# Patient Record
Sex: MALE | Race: WHITE | NOT HISPANIC OR LATINO | URBAN - METROPOLITAN AREA
[De-identification: names, ages, dates, MRNs, and addresses within clinical notes are randomized per-mention and may not be internally consistent; named-entity substitution may affect disease eponyms.]

---

## 2023-01-27 ENCOUNTER — INPATIENT (INPATIENT)
Facility: HOSPITAL | Age: 88
LOS: 6 days | Discharge: NURSING, REHAB OR LONG-TERM CARE | End: 2023-02-03
Attending: STUDENT IN AN ORGANIZED HEALTH CARE EDUCATION/TRAINING PROGRAM | Admitting: STUDENT IN AN ORGANIZED HEALTH CARE EDUCATION/TRAINING PROGRAM
Payer: MEDICARE

## 2023-01-27 VITALS
RESPIRATION RATE: 19 BRPM | WEIGHT: 136.03 LBS | OXYGEN SATURATION: 94 % | HEART RATE: 68 BPM | HEIGHT: 66 IN | DIASTOLIC BLOOD PRESSURE: 83 MMHG | SYSTOLIC BLOOD PRESSURE: 136 MMHG | TEMPERATURE: 98 F

## 2023-01-27 LAB
ALBUMIN SERPL ELPH-MCNC: 3.1 G/DL — LOW (ref 3.3–5)
ALP SERPL-CCNC: 147 U/L — HIGH (ref 40–120)
ALT FLD-CCNC: 21 U/L — SIGNIFICANT CHANGE UP (ref 12–78)
ANION GAP SERPL CALC-SCNC: 10 MMOL/L — SIGNIFICANT CHANGE UP (ref 5–17)
AST SERPL-CCNC: 198 U/L — HIGH (ref 15–37)
BASE EXCESS BLDA CALC-SCNC: -0.2 MMOL/L — SIGNIFICANT CHANGE UP (ref -2–3)
BASOPHILS # BLD AUTO: 0.02 K/UL — SIGNIFICANT CHANGE UP (ref 0–0.2)
BASOPHILS NFR BLD AUTO: 0.4 % — SIGNIFICANT CHANGE UP (ref 0–2)
BILIRUB SERPL-MCNC: 1.4 MG/DL — HIGH (ref 0.2–1.2)
BLOOD GAS COMMENTS ARTERIAL: SIGNIFICANT CHANGE UP
BUN SERPL-MCNC: 59 MG/DL — HIGH (ref 7–23)
CALCIUM SERPL-MCNC: 9.3 MG/DL — SIGNIFICANT CHANGE UP (ref 8.5–10.1)
CHLORIDE SERPL-SCNC: 108 MMOL/L — SIGNIFICANT CHANGE UP (ref 96–108)
CO2 BLDA-SCNC: 25 MMOL/L — HIGH (ref 19–24)
CO2 SERPL-SCNC: 22 MMOL/L — SIGNIFICANT CHANGE UP (ref 22–31)
CREAT SERPL-MCNC: 1.58 MG/DL — HIGH (ref 0.5–1.3)
CRP SERPL-MCNC: 202 MG/L — HIGH
D DIMER BLD IA.RAPID-MCNC: 820 NG/ML DDU — HIGH
EGFR: 43 ML/MIN/1.73M2 — LOW
EOSINOPHIL # BLD AUTO: 0 K/UL — SIGNIFICANT CHANGE UP (ref 0–0.5)
EOSINOPHIL NFR BLD AUTO: 0 % — SIGNIFICANT CHANGE UP (ref 0–6)
FERRITIN SERPL-MCNC: 1478 NG/ML — HIGH (ref 30–400)
FLUAV AG NPH QL: SIGNIFICANT CHANGE UP
FLUBV AG NPH QL: SIGNIFICANT CHANGE UP
GAS PNL BLDA: SIGNIFICANT CHANGE UP
GLUCOSE SERPL-MCNC: 121 MG/DL — HIGH (ref 70–99)
HCO3 BLDA-SCNC: 24 MMOL/L — SIGNIFICANT CHANGE UP (ref 21–28)
HCT VFR BLD CALC: 48.3 % — SIGNIFICANT CHANGE UP (ref 39–50)
HGB BLD-MCNC: 15.6 G/DL — SIGNIFICANT CHANGE UP (ref 13–17)
HOROWITZ INDEX BLDA+IHG-RTO: 40 — SIGNIFICANT CHANGE UP
IMM GRANULOCYTES NFR BLD AUTO: 0.9 % — SIGNIFICANT CHANGE UP (ref 0–0.9)
LYMPHOCYTES # BLD AUTO: 0.5 K/UL — LOW (ref 1–3.3)
LYMPHOCYTES # BLD AUTO: 9.1 % — LOW (ref 13–44)
MCHC RBC-ENTMCNC: 27.6 PG — SIGNIFICANT CHANGE UP (ref 27–34)
MCHC RBC-ENTMCNC: 32.3 G/DL — SIGNIFICANT CHANGE UP (ref 32–36)
MCV RBC AUTO: 85.5 FL — SIGNIFICANT CHANGE UP (ref 80–100)
MONOCYTES # BLD AUTO: 0.63 K/UL — SIGNIFICANT CHANGE UP (ref 0–0.9)
MONOCYTES NFR BLD AUTO: 11.4 % — SIGNIFICANT CHANGE UP (ref 2–14)
NEUTROPHILS # BLD AUTO: 4.31 K/UL — SIGNIFICANT CHANGE UP (ref 1.8–7.4)
NEUTROPHILS NFR BLD AUTO: 78.2 % — HIGH (ref 43–77)
NRBC # BLD: 0 /100 WBCS — SIGNIFICANT CHANGE UP (ref 0–0)
NT-PROBNP SERPL-SCNC: 762 PG/ML — HIGH (ref 0–450)
PCO2 BLDA: 37 MMHG — SIGNIFICANT CHANGE UP (ref 32–46)
PH BLDA: 7.42 — SIGNIFICANT CHANGE UP (ref 7.35–7.45)
PLATELET # BLD AUTO: 145 K/UL — LOW (ref 150–400)
PO2 BLDA: 179 MMHG — HIGH (ref 83–108)
POTASSIUM SERPL-MCNC: 3.9 MMOL/L — SIGNIFICANT CHANGE UP (ref 3.5–5.3)
POTASSIUM SERPL-SCNC: 3.9 MMOL/L — SIGNIFICANT CHANGE UP (ref 3.5–5.3)
PROCALCITONIN SERPL-MCNC: 0.48 NG/ML — HIGH (ref 0.02–0.1)
PROT SERPL-MCNC: 7.9 GM/DL — SIGNIFICANT CHANGE UP (ref 6–8.3)
RBC # BLD: 5.65 M/UL — SIGNIFICANT CHANGE UP (ref 4.2–5.8)
RBC # FLD: 14.8 % — HIGH (ref 10.3–14.5)
SAO2 % BLDA: 99.7 % — HIGH (ref 94–98)
SARS-COV-2 RNA SPEC QL NAA+PROBE: DETECTED
SODIUM SERPL-SCNC: 140 MMOL/L — SIGNIFICANT CHANGE UP (ref 135–145)
WBC # BLD: 5.51 K/UL — SIGNIFICANT CHANGE UP (ref 3.8–10.5)
WBC # FLD AUTO: 5.51 K/UL — SIGNIFICANT CHANGE UP (ref 3.8–10.5)

## 2023-01-27 PROCEDURE — 93010 ELECTROCARDIOGRAM REPORT: CPT

## 2023-01-27 PROCEDURE — 71275 CT ANGIOGRAPHY CHEST: CPT | Mod: 26,MA

## 2023-01-27 PROCEDURE — 99223 1ST HOSP IP/OBS HIGH 75: CPT

## 2023-01-27 PROCEDURE — 71045 X-RAY EXAM CHEST 1 VIEW: CPT | Mod: 26

## 2023-01-27 PROCEDURE — 99285 EMERGENCY DEPT VISIT HI MDM: CPT

## 2023-01-27 RX ORDER — CEFTRIAXONE 500 MG/1
1000 INJECTION, POWDER, FOR SOLUTION INTRAMUSCULAR; INTRAVENOUS EVERY 24 HOURS
Refills: 0 | Status: COMPLETED | OUTPATIENT
Start: 2023-01-27 | End: 2023-02-03

## 2023-01-27 RX ORDER — IPRATROPIUM/ALBUTEROL SULFATE 18-103MCG
3 AEROSOL WITH ADAPTER (GRAM) INHALATION ONCE
Refills: 0 | Status: COMPLETED | OUTPATIENT
Start: 2023-01-27 | End: 2023-01-27

## 2023-01-27 RX ORDER — ASPIRIN/CALCIUM CARB/MAGNESIUM 324 MG
81 TABLET ORAL DAILY
Refills: 0 | Status: DISCONTINUED | OUTPATIENT
Start: 2023-01-27 | End: 2023-02-03

## 2023-01-27 RX ORDER — ATORVASTATIN CALCIUM 80 MG/1
20 TABLET, FILM COATED ORAL AT BEDTIME
Refills: 0 | Status: DISCONTINUED | OUTPATIENT
Start: 2023-01-27 | End: 2023-02-03

## 2023-01-27 RX ORDER — AZITHROMYCIN 500 MG/1
500 TABLET, FILM COATED ORAL EVERY 24 HOURS
Refills: 0 | Status: DISCONTINUED | OUTPATIENT
Start: 2023-01-28 | End: 2023-02-03

## 2023-01-27 RX ORDER — ATORVASTATIN CALCIUM 80 MG/1
1 TABLET, FILM COATED ORAL
Qty: 0 | Refills: 0 | DISCHARGE

## 2023-01-27 RX ORDER — SODIUM CHLORIDE 9 MG/ML
1000 INJECTION INTRAMUSCULAR; INTRAVENOUS; SUBCUTANEOUS ONCE
Refills: 0 | Status: COMPLETED | OUTPATIENT
Start: 2023-01-27 | End: 2023-01-27

## 2023-01-27 RX ORDER — ASPIRIN/CALCIUM CARB/MAGNESIUM 324 MG
1 TABLET ORAL
Qty: 0 | Refills: 0 | DISCHARGE

## 2023-01-27 RX ORDER — CARBIDOPA AND LEVODOPA 25; 100 MG/1; MG/1
1 TABLET ORAL
Refills: 0 | Status: DISCONTINUED | OUTPATIENT
Start: 2023-01-27 | End: 2023-02-01

## 2023-01-27 RX ORDER — ALBUTEROL 90 UG/1
0 AEROSOL, METERED ORAL
Qty: 0 | Refills: 0 | DISCHARGE

## 2023-01-27 RX ORDER — LISINOPRIL 2.5 MG/1
20 TABLET ORAL DAILY
Refills: 0 | Status: DISCONTINUED | OUTPATIENT
Start: 2023-01-27 | End: 2023-02-03

## 2023-01-27 RX ORDER — SODIUM CHLORIDE 9 MG/ML
1000 INJECTION, SOLUTION INTRAVENOUS
Refills: 0 | Status: DISCONTINUED | OUTPATIENT
Start: 2023-01-27 | End: 2023-01-29

## 2023-01-27 RX ORDER — AZITHROMYCIN 500 MG/1
TABLET, FILM COATED ORAL
Refills: 0 | Status: DISCONTINUED | OUTPATIENT
Start: 2023-01-27 | End: 2023-02-03

## 2023-01-27 RX ORDER — OXYCODONE HYDROCHLORIDE 5 MG/1
5 TABLET ORAL ONCE
Refills: 0 | Status: DISCONTINUED | OUTPATIENT
Start: 2023-01-27 | End: 2023-01-27

## 2023-01-27 RX ORDER — AZITHROMYCIN 500 MG/1
500 TABLET, FILM COATED ORAL ONCE
Refills: 0 | Status: COMPLETED | OUTPATIENT
Start: 2023-01-27 | End: 2023-01-27

## 2023-01-27 RX ORDER — ENOXAPARIN SODIUM 100 MG/ML
60 INJECTION SUBCUTANEOUS EVERY 12 HOURS
Refills: 0 | Status: DISCONTINUED | OUTPATIENT
Start: 2023-01-27 | End: 2023-02-03

## 2023-01-27 RX ORDER — ALBUTEROL 90 UG/1
2 AEROSOL, METERED ORAL EVERY 12 HOURS
Refills: 0 | Status: DISCONTINUED | OUTPATIENT
Start: 2023-01-27 | End: 2023-02-03

## 2023-01-27 RX ADMIN — SODIUM CHLORIDE 100 MILLILITER(S): 9 INJECTION, SOLUTION INTRAVENOUS at 23:41

## 2023-01-27 RX ADMIN — Medication 0.2 MILLIGRAM(S): at 14:13

## 2023-01-27 RX ADMIN — AZITHROMYCIN 500 MILLIGRAM(S): 500 TABLET, FILM COATED ORAL at 23:34

## 2023-01-27 RX ADMIN — Medication 3 MILLILITER(S): at 12:17

## 2023-01-27 RX ADMIN — Medication 125 MILLIGRAM(S): at 12:16

## 2023-01-27 RX ADMIN — SODIUM CHLORIDE 1000 MILLILITER(S): 9 INJECTION INTRAMUSCULAR; INTRAVENOUS; SUBCUTANEOUS at 12:17

## 2023-01-27 NOTE — ED ADULT TRIAGE NOTE - CHIEF COMPLAINT QUOTE
BIBA- from home  +cvd 5days ago and c/o weakness  Trach for Lung CA, Emphysema, COPD  HTN, uses walker at home

## 2023-01-27 NOTE — H&P ADULT - ASSESSMENT
87 year old male PMH HTN, PD, Copd, lung ca, sp radiation and now with trach, covid pos as of 1/21/23 not vaccinated. Now presents with sob, poor appetite, very weak.  Covid positive here in ER.     Plan:  87 year old male PMH HTN, PD, Copd, lung ca, sp radiation and now with trach capped at home, covid pos as of 1/21/23 not vaccinated. Now presents with sob, poor appetite, very weak. Covid positive here in ER.       Plan: Admit to medicine for CAP and dehydration. Will start IVF and Ceftriaxone and Zithromax. CTA notes no PE, RLL infiltrate is confirmed. Normally   family removed trach cap at night and replaces in AM. Has FE on arrival with creatinine 1.58, follow repeat in AM. D-dimer is elevated 820, meets  criteria for Lovenox 1 mg/kg bid. RA sat normal at 97 %, hold on Remdiziver/Dedadron. Wife confirms her eats puree diet.      Continue home meds of Ramipril, ASA, Lipitor, Sinemet and Albuterol at home doses. All meds were confirmed with wife at [phone].

## 2023-01-27 NOTE — ED ADULT NURSE NOTE - NSFALLRSKPASTHIST_ED_ALL_ED
Calling to report worsening cold sx since sunday: feels now become sinus infection - congestion, pressure, pain, severe headache, pressure in face when bends over, sore throat, productive cough.     Covid neg on several home tests     Evisit recommended? Or UC?    Thank you  Africa Brady RN  [facility]    no

## 2023-01-27 NOTE — H&P ADULT - NSHPPHYSICALEXAM_GEN_ALL_CORE
PHYSICAL EXAMINATION:  Vital Signs Last 24 Hrs  T(C): 36.7 (27 Jan 2023 18:47), Max: 36.8 (27 Jan 2023 15:00)  T(F): 98 (27 Jan 2023 18:47), Max: 98.2 (27 Jan 2023 15:00)  HR: 73 (27 Jan 2023 18:47) (68 - 98)  BP: 159/77 (27 Jan 2023 18:47) (123/76 - 192/93)  BP(mean): --  RR: 19 (27 Jan 2023 18:47) (16 - 19)  SpO2: 97% (27 Jan 2023 18:47) (91% - 100%)    Parameters below as of 27 Jan 2023 18:47  Patient On (Oxygen Delivery Method): room air      CAPILLARY BLOOD GLUCOSE          GENERAL: NAD,   ENMT: mucous membranes moist  NECK: supple, No JVD  CHEST/LUNG: clear to auscultation bilaterally; no rales, rhonchi, or wheezing b/l  HEART: normal S1, S2  ABDOMEN: BS+, soft, ND, NT   EXTREMITIES:  pulses palpable; no clubbing, cyanosis, or edema b/l LEs  NEURO: awake, alert, interactive; moves all extremities PHYSICAL EXAMINATION:  Vital Signs Last 24 Hrs  T(C): 36.7 (27 Jan 2023 18:47), Max: 36.8 (27 Jan 2023 15:00)  T(F): 98 (27 Jan 2023 18:47), Max: 98.2 (27 Jan 2023 15:00)  HR: 73 (27 Jan 2023 18:47) (68 - 98)  BP: 159/77 (27 Jan 2023 18:47) (123/76 - 192/93)  BP(mean): --  RR: 19 (27 Jan 2023 18:47) (16 - 19)  SpO2: 97% (27 Jan 2023 18:47) (91% - 100%)    Parameters below as of 27 Jan 2023 18:47  Patient On (Oxygen Delivery Method): room air      CAPILLARY BLOOD GLUCOSE          GENERAL: NAD, seen on 1-D, sleeping, no fevers, cough or CP. Trach in place. Capped  ENMT: mucous membranes moist  NECK: supple, No JVD  CHEST/LUNG: clear to auscultation bilaterally; no rales, rhonchi, or wheezing b/l  HEART: normal S1, S2  ABDOMEN: BS+, soft, ND, NT   EXTREMITIES:  pulses palpable; no clubbing, cyanosis, or edema b/l LEs  NEURO: awake, alert, interactive; moves all extremities

## 2023-01-27 NOTE — ED PROVIDER NOTE - PHYSICAL EXAMINATION
Gen: Alert, NAD, chronically ill appearing  Head: NC, AT   Eyes: PERRL, EOMI, normal lids/conjunctiva  ENT: normal hearing, patent oropharynx without erythema/exudate, uvula midline  Neck: trach, with coarse mucous   Pulm: Bilateral BS, normal resp effort, no wheeze/stridor/retractions  CV: RRR, no M/R/G, 2+ radial and dp pulses bl, no edema  Abd: soft, NT/ND, +BS, no hepatosplenomegaly  Mskel: extremities x4 with normal ROM and no joint effusions. no ctl spine ttp.   Skin: no rash, no bruising   Neuro: AAOx3, no sensory/motor deficits, CN 2-12 intact

## 2023-01-27 NOTE — ED PROVIDER NOTE - CLINICAL SUMMARY MEDICAL DECISION MAKING FREE TEXT BOX
covid positive, unvaccinated. covid positive, unvaccinated.  As interpreted by ED physician, ECG is NSR with normal intervals/axis, no changes in QRS, no ST/T changes. covid positive, unvaccinated. high risk with lung ca and copd. admit for remdesivir.   As interpreted by ED physician, ECG is NSR with normal intervals/axis, no changes in QRS, no ST/T changes.

## 2023-01-27 NOTE — H&P ADULT - NSHPLABSRESULTS_GEN_ALL_CORE
LABS:                        15.6   5.51  )-----------( 145      ( 27 Jan 2023 12:00 )             48.3     01-27    140  |  108  |  59<H>  ----------------------------<  121<H>  3.9   |  22  |  1.58<H>    Ca    9.3      27 Jan 2023 12:00    TPro  7.9  /  Alb  3.1<L>  /  TBili  1.4<H>  /  DBili  x   /  AST  198<H>  /  ALT  21  /  AlkPhos  147<H>  01-27            RADIOLOGY & ADDITIONAL TESTS:

## 2023-01-27 NOTE — H&P ADULT - HISTORY OF PRESENT ILLNESS
87 year old male PMH HTN, PD, Copd, lung ca, sp radiation and now with trach, covid pos as of 1/21/23 not vaccinated. Now presents with sob, poor appetite, very weak.  Covid positive here in ER.  87 year old male PMH HTN, PD, Copd, lung ca, sp radiation and now with capped trach for several years, covid positive as of 1/21/23, never vaccinated. Now presents with sob, poor appetite, very weak.  Covid positive here in ER.

## 2023-01-27 NOTE — ED PROVIDER NOTE - UNABLE TO OBTAIN
Severe Illness/Injury has trach Alert and oriented, no focal deficits, no motor or sensory deficits.

## 2023-01-27 NOTE — ED PROVIDER NOTE - OBJECTIVE STATEMENT
87M hx htn, PD, copd, lung ca, sp radiation and now with trach, covid pos as of 1/21/23 not vaccinated. complains of sob. poor appetite. very weak.

## 2023-01-28 LAB
ANION GAP SERPL CALC-SCNC: 8 MMOL/L — SIGNIFICANT CHANGE UP (ref 5–17)
BUN SERPL-MCNC: 47 MG/DL — HIGH (ref 7–23)
CALCIUM SERPL-MCNC: 8.9 MG/DL — SIGNIFICANT CHANGE UP (ref 8.5–10.1)
CHLORIDE SERPL-SCNC: 108 MMOL/L — SIGNIFICANT CHANGE UP (ref 96–108)
CO2 SERPL-SCNC: 26 MMOL/L — SIGNIFICANT CHANGE UP (ref 22–31)
CREAT SERPL-MCNC: 1.25 MG/DL — SIGNIFICANT CHANGE UP (ref 0.5–1.3)
EGFR: 56 ML/MIN/1.73M2 — LOW
GLUCOSE SERPL-MCNC: 121 MG/DL — HIGH (ref 70–99)
POTASSIUM SERPL-MCNC: 3.7 MMOL/L — SIGNIFICANT CHANGE UP (ref 3.5–5.3)
POTASSIUM SERPL-SCNC: 3.7 MMOL/L — SIGNIFICANT CHANGE UP (ref 3.5–5.3)
SODIUM SERPL-SCNC: 142 MMOL/L — SIGNIFICANT CHANGE UP (ref 135–145)

## 2023-01-28 PROCEDURE — 99233 SBSQ HOSP IP/OBS HIGH 50: CPT

## 2023-01-28 RX ORDER — LANOLIN ALCOHOL/MO/W.PET/CERES
3 CREAM (GRAM) TOPICAL ONCE
Refills: 0 | Status: COMPLETED | OUTPATIENT
Start: 2023-01-28 | End: 2023-01-28

## 2023-01-28 RX ORDER — DEXAMETHASONE 0.5 MG/5ML
6 ELIXIR ORAL DAILY
Refills: 0 | Status: DISCONTINUED | OUTPATIENT
Start: 2023-01-28 | End: 2023-02-03

## 2023-01-28 RX ORDER — INFLUENZA VIRUS VACCINE 15; 15; 15; 15 UG/.5ML; UG/.5ML; UG/.5ML; UG/.5ML
0.7 SUSPENSION INTRAMUSCULAR ONCE
Refills: 0 | Status: DISCONTINUED | OUTPATIENT
Start: 2023-01-28 | End: 2023-01-28

## 2023-01-28 RX ORDER — GUAIFENESIN/DEXTROMETHORPHAN 600MG-30MG
10 TABLET, EXTENDED RELEASE 12 HR ORAL EVERY 4 HOURS
Refills: 0 | Status: DISCONTINUED | OUTPATIENT
Start: 2023-01-28 | End: 2023-02-03

## 2023-01-28 RX ADMIN — SODIUM CHLORIDE 100 MILLILITER(S): 9 INJECTION, SOLUTION INTRAVENOUS at 12:04

## 2023-01-28 RX ADMIN — Medication 3 MILLIGRAM(S): at 21:55

## 2023-01-28 RX ADMIN — CARBIDOPA AND LEVODOPA 1 TABLET(S): 25; 100 TABLET ORAL at 09:43

## 2023-01-28 RX ADMIN — ENOXAPARIN SODIUM 60 MILLIGRAM(S): 100 INJECTION SUBCUTANEOUS at 18:55

## 2023-01-28 RX ADMIN — CEFTRIAXONE 100 MILLIGRAM(S): 500 INJECTION, POWDER, FOR SOLUTION INTRAMUSCULAR; INTRAVENOUS at 05:54

## 2023-01-28 RX ADMIN — CARBIDOPA AND LEVODOPA 1 TABLET(S): 25; 100 TABLET ORAL at 21:48

## 2023-01-28 RX ADMIN — ENOXAPARIN SODIUM 60 MILLIGRAM(S): 100 INJECTION SUBCUTANEOUS at 05:54

## 2023-01-28 RX ADMIN — ALBUTEROL 2 PUFF(S): 90 AEROSOL, METERED ORAL at 05:54

## 2023-01-28 RX ADMIN — ATORVASTATIN CALCIUM 20 MILLIGRAM(S): 80 TABLET, FILM COATED ORAL at 21:48

## 2023-01-28 RX ADMIN — Medication 81 MILLIGRAM(S): at 12:04

## 2023-01-28 RX ADMIN — LISINOPRIL 20 MILLIGRAM(S): 2.5 TABLET ORAL at 05:54

## 2023-01-28 RX ADMIN — SODIUM CHLORIDE 100 MILLILITER(S): 9 INJECTION, SOLUTION INTRAVENOUS at 22:33

## 2023-01-28 RX ADMIN — AZITHROMYCIN 255 MILLIGRAM(S): 500 TABLET, FILM COATED ORAL at 21:48

## 2023-01-28 NOTE — PATIENT PROFILE ADULT - FUNCTIONAL ASSESSMENT - BASIC MOBILITY 6.
2-calculated by average/Not able to assess (calculate score using [facility] averaging method)

## 2023-01-28 NOTE — PHARMACOTHERAPY INTERVENTION NOTE - COMMENTS
Recommended to order a Legionella urinary antigen since patient has been empirically started on azithromycin for the treatment of pneumonia.    Gayle Hernandez, PharmD, BCIDP  Clinical Pharmacy Specialist, Infectious Diseases  Tele-Antimicrobial Stewardship Program (Tele-ASP)  Tele-ASP Phone: [phone]

## 2023-01-28 NOTE — PATIENT PROFILE ADULT - FALL HARM RISK - HARM RISK INTERVENTIONS
Assistance with ambulation/Assistance OOB with selected safe patient handling equipment/Communicate Risk of Fall with Harm to all staff/Discuss with provider need for PT consult/Monitor gait and stability/Reinforce activity limits and safety measures with patient and family/Tailored Fall Risk Interventions/Visual Cue: Yellow wristband and red socks/Bed in lowest position, wheels locked, appropriate side rails in place/Call bell, personal items and telephone in reach/Instruct patient to call for assistance before getting out of bed or chair/Non-slip footwear when patient is out of bed/[place] to call system/Physically safe environment - no spills, clutter or unnecessary equipment/Purposeful Proactive Rounding/Room/bathroom lighting operational, light cord in reach

## 2023-01-28 NOTE — PATIENT PROFILE ADULT - NSPROGENSOURCEINFO_GEN_A_NUR
family You can access the FollowMyHealth Patient Portal offered by [facility] by registering at the following website: [email or web address]. By joining [employer]’s FollowMyHealth portal, you will also be able to view your health information using other applications (apps) compatible with our system.

## 2023-01-28 NOTE — PROGRESS NOTE ADULT - ASSESSMENT
87 year old male PMH HTN, PD, Copd, lung ca, sp radiation and now with trach capped at home, covid pos as of 1/21/23 not vaccinated. Now presents with sob, poor appetite, very weak. Covid positive here in ER.       Plan: Admit to medicine for CAP and dehydration. Will start IVF and Ceftriaxone and Zithromax. CTA notes no PE, RLL infiltrate is confirmed. Normally   family removed trach cap at night and replaces in AM. Has FE on arrival with creatinine 1.58, follow repeat in AM. D-dimer is elevated 820, meets  criteria for Lovenox 1 mg/kg bid. RA sat normal at 97 %, hold on Remdiziver/Dedadron. Wife confirms her eats puree diet.      (1/28) Will start pt on decadron due to clinical exam. C/W monitoring resp status. Trach care. C/W abx  Pt eval    Continue home meds of Ramipril, ASA, Lipitor, Sinemet and Albuterol at home doses.

## 2023-01-28 NOTE — PROGRESS NOTE ADULT - SUBJECTIVE AND OBJECTIVE BOX
Patient is a 87y old  Male who presents with a chief complaint of Weakness from COVID and pneumonia. (2023 19:20)    INTERVAL HPI/OVERNIGHT EVENTS: Patients seen and examined at bedside this morning. No acute events overnight. Pt reports congestion and non-productive cough. Seen at bedside with wife. Poor appetite.    MEDICATIONS  (STANDING):  albuterol    90 MICROgram(s) HFA Inhaler 2 Puff(s) Inhalation every 12 hours  aspirin enteric coated 81 milliGRAM(s) Oral daily  atorvastatin 20 milliGRAM(s) Oral at bedtime  azithromycin  IVPB      azithromycin  IVPB 500 milliGRAM(s) IV Intermittent every 24 hours  carbidopa/levodopa  25/100 1 Tablet(s) Oral <User Schedule>  cefTRIAXone   IVPB 1000 milliGRAM(s) IV Intermittent every 24 hours  dexAMETHasone     Tablet 6 milliGRAM(s) Oral daily  enoxaparin Injectable 60 milliGRAM(s) SubCutaneous every 12 hours  lisinopril 20 milliGRAM(s) Oral daily  sodium chloride 0.45%. 1000 milliLiter(s) (100 mL/Hr) IV Continuous <Continuous>    MEDICATIONS  (PRN):  guaifenesin/dextromethorphan Oral Liquid 10 milliLiter(s) Oral every 4 hours PRN Cough    Allergies    No Known Allergies    Intolerances      REVIEW OF SYSTEMS:  All other systems reviewed and are negative    Vital Signs Last 24 Hrs  T(C): 36.6 (2023 11:43), Max: 36.8 (2023 15:00)  T(F): 97.9 (2023 11:43), Max: 98.2 (2023 15:00)  HR: 82 (2023 11:43) (57 - 98)  BP: 152/81 (2023 11:43) (123/76 - 192/93)  BP(mean): --  RR: 18 (2023 11:43) (16 - 19)  SpO2: 91% (2023 11:43) (91% - 100%)    Parameters below as of 2023 11:43  Patient On (Oxygen Delivery Method): room air      Daily     Daily Weight in k.5 (2023 01:00)  I&O's Summary    2023 07:01  -  2023 07:00  --------------------------------------------------------  IN: 750 mL / OUT: 0 mL / NET: 750 mL      CAPILLARY BLOOD GLUCOSE        PHYSICAL EXAM:  GENERAL: NAD, seen on 1-D, sleeping, no fevers, cough or CP. Trach in place. Capped  ENMT: mucous membranes moist  NECK: supple, No JVD  CHEST/LUNG: clear to auscultation bilaterally; no rales, rhonchi, or wheezing b/l  HEART: normal S1, S2  ABDOMEN: BS+, soft, ND, NT   EXTREMITIES:  pulses palpable; no clubbing, cyanosis, or edema b/l LEs  NEURO: awake, alert, interactive; moves all extremities      Labs                          15.6   5.51  )-----------( 145      ( 2023 12:00 )             48.3         142  |  108  |  47<H>  ----------------------------<  121<H>  3.7   |  26  |  1.25    Ca    8.9      2023 06:29    TPro  7.9  /  Alb  3.1<L>  /  TBili  1.4<H>  /  DBili  x   /  AST  198<H>  /  ALT  21  /  AlkPhos  147<H>                              Radiology and Imaging reviewed.

## 2023-01-29 PROCEDURE — 99233 SBSQ HOSP IP/OBS HIGH 50: CPT

## 2023-01-29 RX ORDER — AMLODIPINE BESYLATE 2.5 MG/1
10 TABLET ORAL ONCE
Refills: 0 | Status: COMPLETED | OUTPATIENT
Start: 2023-01-29 | End: 2023-01-29

## 2023-01-29 RX ADMIN — AMLODIPINE BESYLATE 10 MILLIGRAM(S): 2.5 TABLET ORAL at 19:05

## 2023-01-29 RX ADMIN — ENOXAPARIN SODIUM 60 MILLIGRAM(S): 100 INJECTION SUBCUTANEOUS at 05:30

## 2023-01-29 RX ADMIN — ALBUTEROL 2 PUFF(S): 90 AEROSOL, METERED ORAL at 19:05

## 2023-01-29 RX ADMIN — CARBIDOPA AND LEVODOPA 1 TABLET(S): 25; 100 TABLET ORAL at 20:55

## 2023-01-29 RX ADMIN — LISINOPRIL 20 MILLIGRAM(S): 2.5 TABLET ORAL at 05:29

## 2023-01-29 RX ADMIN — ATORVASTATIN CALCIUM 20 MILLIGRAM(S): 80 TABLET, FILM COATED ORAL at 21:59

## 2023-01-29 RX ADMIN — ENOXAPARIN SODIUM 60 MILLIGRAM(S): 100 INJECTION SUBCUTANEOUS at 17:27

## 2023-01-29 RX ADMIN — Medication 81 MILLIGRAM(S): at 11:44

## 2023-01-29 RX ADMIN — ALBUTEROL 2 PUFF(S): 90 AEROSOL, METERED ORAL at 05:32

## 2023-01-29 RX ADMIN — CARBIDOPA AND LEVODOPA 1 TABLET(S): 25; 100 TABLET ORAL at 09:08

## 2023-01-29 RX ADMIN — CEFTRIAXONE 100 MILLIGRAM(S): 500 INJECTION, POWDER, FOR SOLUTION INTRAMUSCULAR; INTRAVENOUS at 05:30

## 2023-01-29 RX ADMIN — Medication 6 MILLIGRAM(S): at 05:29

## 2023-01-29 RX ADMIN — AZITHROMYCIN 255 MILLIGRAM(S): 500 TABLET, FILM COATED ORAL at 19:59

## 2023-01-29 NOTE — PHYSICAL THERAPY INITIAL EVALUATION ADULT - GAIT TRAINING, PT EVAL
To be able to perform ambulation independently using rolling walker for 100 feet, using proper technique using AD, with proper posture and functional distance at home  in 4 weeks.

## 2023-01-29 NOTE — PHYSICAL THERAPY INITIAL EVALUATION ADULT - DID THE PATIENT HAVE SURGERY?
This is the hx of M.S. a 86 y/o male patient who [place] [facility] [place] of [place]NA due to Covid19 affecting medical condition and with subsequent affection on functional mobility./n/a

## 2023-01-29 NOTE — PROGRESS NOTE ADULT - SUBJECTIVE AND OBJECTIVE BOX
Patient is a 87y old  Male who presents with a chief complaint of Weakness from COVID and pneumonia. (28 Jan 2023 12:03)    INTERVAL HPI/OVERNIGHT EVENTS: Patients seen and examined at bedside this morning. No acute events overnight. Pt reports productive cough.     MEDICATIONS  (STANDING):  albuterol    90 MICROgram(s) HFA Inhaler 2 Puff(s) Inhalation every 12 hours  aspirin enteric coated 81 milliGRAM(s) Oral daily  atorvastatin 20 milliGRAM(s) Oral at bedtime  azithromycin  IVPB      azithromycin  IVPB 500 milliGRAM(s) IV Intermittent every 24 hours  carbidopa/levodopa  25/100 1 Tablet(s) Oral <User Schedule>  cefTRIAXone   IVPB 1000 milliGRAM(s) IV Intermittent every 24 hours  dexAMETHasone     Tablet 6 milliGRAM(s) Oral daily  enoxaparin Injectable 60 milliGRAM(s) SubCutaneous every 12 hours  lisinopril 20 milliGRAM(s) Oral daily  sodium chloride 0.45%. 1000 milliLiter(s) (100 mL/Hr) IV Continuous <Continuous>    MEDICATIONS  (PRN):  guaifenesin/dextromethorphan Oral Liquid 10 milliLiter(s) Oral every 4 hours PRN Cough    Allergies    No Known Allergies    Intolerances      REVIEW OF SYSTEMS:  All other systems reviewed and are negative    Vital Signs Last 24 Hrs  T(C): 36.4 (29 Jan 2023 12:06), Max: 36.4 (29 Jan 2023 12:06)  T(F): 97.5 (29 Jan 2023 12:06), Max: 97.5 (29 Jan 2023 12:06)  HR: 74 (29 Jan 2023 12:06) (73 - 76)  BP: 175/84 (29 Jan 2023 12:06) (124/74 - 175/84)  BP(mean): --  RR: 17 (29 Jan 2023 12:06) (17 - 18)  SpO2: 93% (29 Jan 2023 12:06) (91% - 93%)    Parameters below as of 29 Jan 2023 05:03  Patient On (Oxygen Delivery Method): room air      Daily     Daily   I&O's Summary    28 Jan 2023 07:01  -  29 Jan 2023 07:00  --------------------------------------------------------  IN: 0 mL / OUT: 800 mL / NET: -800 mL      CAPILLARY BLOOD GLUCOSE        PHYSICAL EXAM:  GENERAL: NAD, seen on 1-D, sleeping, no fevers, cough or CP. Trach in place. Capped  ENMT: mucous membranes moist  NECK: supple, No JVD  CHEST/LUNG: clear to auscultation bilaterally; no rales, rhonchi, or wheezing b/l  HEART: normal S1, S2  ABDOMEN: BS+, soft, ND, NT   EXTREMITIES:  pulses palpable; no clubbing, cyanosis, or edema b/l LEs  NEURO: awake, alert, interactive; moves all extremities      Labs      01-28    142  |  108  |  47<H>  ----------------------------<  121<H>  3.7   |  26  |  1.25    Ca    8.9      28 Jan 2023 06:29                              Radiology and Imaging reviewed.

## 2023-01-29 NOTE — PROGRESS NOTE ADULT - ASSESSMENT
87 year old male PMH HTN, PD, Copd, lung ca, sp radiation and now with trach capped at home, covid pos as of 1/21/23 not vaccinated. Now presents with sob, poor appetite, very weak. Covid positive here in ER.       Plan: Admit to medicine for CAP and dehydration. Will start IVF and Ceftriaxone and Zithromax. CTA notes no PE, RLL infiltrate is confirmed. Normally   family removed trach cap at night and replaces in AM. Has FE on arrival with creatinine 1.58, follow repeat in AM. D-dimer is elevated 820, meets  criteria for Lovenox 1 mg/kg bid. RA sat normal at 97 %, hold on Remdiziver/Dedadron. Wife confirms her eats puree diet.      (1/28) Will start pt on decadron due to clinical exam. C/W monitoring resp status. Trach care. C/W abx  Pt eval: DAVON  (1/29) C/W abx. Trach care. C/W steroids     Continue home meds of Ramipril, ASA, Lipitor, Sinemet and Albuterol at home doses.

## 2023-01-29 NOTE — PHYSICAL THERAPY INITIAL EVALUATION ADULT - PERTINENT HX OF CURRENT PROBLEM, REHAB EVAL
This is the hx of M.S. a 86 y/o male patient [place] [facility] [place] due to Covid19 affecting medical condition and with subsequent affection on functional mobility. CT Angio chest 1/27/23: R lower bilateral PNA.

## 2023-01-29 NOTE — PHYSICAL THERAPY INITIAL EVALUATION ADULT - ADDITIONAL COMMENTS
as per patient, wife assist pt at home, lives with wife. as per patient, wife assist pt at home, lives with wife. 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  Lives With: spouse; in a private house with  4 steps to enter and 2 flight of stair to negotiate

## 2023-01-30 LAB
ANION GAP SERPL CALC-SCNC: 8 MMOL/L — SIGNIFICANT CHANGE UP (ref 5–17)
BUN SERPL-MCNC: 29 MG/DL — HIGH (ref 7–23)
CALCIUM SERPL-MCNC: 8.6 MG/DL — SIGNIFICANT CHANGE UP (ref 8.5–10.1)
CHLORIDE SERPL-SCNC: 110 MMOL/L — HIGH (ref 96–108)
CO2 SERPL-SCNC: 23 MMOL/L — SIGNIFICANT CHANGE UP (ref 22–31)
CREAT SERPL-MCNC: 0.94 MG/DL — SIGNIFICANT CHANGE UP (ref 0.5–1.3)
EGFR: 79 ML/MIN/1.73M2 — SIGNIFICANT CHANGE UP
GLUCOSE SERPL-MCNC: 114 MG/DL — HIGH (ref 70–99)
HCT VFR BLD CALC: 44.2 % — SIGNIFICANT CHANGE UP (ref 39–50)
HGB BLD-MCNC: 14.4 G/DL — SIGNIFICANT CHANGE UP (ref 13–17)
MCHC RBC-ENTMCNC: 27.4 PG — SIGNIFICANT CHANGE UP (ref 27–34)
MCHC RBC-ENTMCNC: 32.6 G/DL — SIGNIFICANT CHANGE UP (ref 32–36)
MCV RBC AUTO: 84.2 FL — SIGNIFICANT CHANGE UP (ref 80–100)
NRBC # BLD: 0 /100 WBCS — SIGNIFICANT CHANGE UP (ref 0–0)
PLATELET # BLD AUTO: 141 K/UL — LOW (ref 150–400)
POTASSIUM SERPL-MCNC: 3.5 MMOL/L — SIGNIFICANT CHANGE UP (ref 3.5–5.3)
POTASSIUM SERPL-SCNC: 3.5 MMOL/L — SIGNIFICANT CHANGE UP (ref 3.5–5.3)
RBC # BLD: 5.25 M/UL — SIGNIFICANT CHANGE UP (ref 4.2–5.8)
RBC # FLD: 14.5 % — SIGNIFICANT CHANGE UP (ref 10.3–14.5)
SODIUM SERPL-SCNC: 141 MMOL/L — SIGNIFICANT CHANGE UP (ref 135–145)
WBC # BLD: 7.71 K/UL — SIGNIFICANT CHANGE UP (ref 3.8–10.5)
WBC # FLD AUTO: 7.71 K/UL — SIGNIFICANT CHANGE UP (ref 3.8–10.5)

## 2023-01-30 PROCEDURE — 99232 SBSQ HOSP IP/OBS MODERATE 35: CPT

## 2023-01-30 RX ORDER — AMLODIPINE BESYLATE 2.5 MG/1
10 TABLET ORAL AT BEDTIME
Refills: 0 | Status: DISCONTINUED | OUTPATIENT
Start: 2023-01-30 | End: 2023-02-03

## 2023-01-30 RX ADMIN — LISINOPRIL 20 MILLIGRAM(S): 2.5 TABLET ORAL at 05:44

## 2023-01-30 RX ADMIN — CARBIDOPA AND LEVODOPA 1 TABLET(S): 25; 100 TABLET ORAL at 08:01

## 2023-01-30 RX ADMIN — ENOXAPARIN SODIUM 60 MILLIGRAM(S): 100 INJECTION SUBCUTANEOUS at 05:44

## 2023-01-30 RX ADMIN — Medication 81 MILLIGRAM(S): at 11:20

## 2023-01-30 RX ADMIN — ATORVASTATIN CALCIUM 20 MILLIGRAM(S): 80 TABLET, FILM COATED ORAL at 21:49

## 2023-01-30 RX ADMIN — ENOXAPARIN SODIUM 60 MILLIGRAM(S): 100 INJECTION SUBCUTANEOUS at 17:20

## 2023-01-30 RX ADMIN — AMLODIPINE BESYLATE 10 MILLIGRAM(S): 2.5 TABLET ORAL at 21:49

## 2023-01-30 RX ADMIN — ALBUTEROL 2 PUFF(S): 90 AEROSOL, METERED ORAL at 05:44

## 2023-01-30 RX ADMIN — CARBIDOPA AND LEVODOPA 1 TABLET(S): 25; 100 TABLET ORAL at 21:48

## 2023-01-30 RX ADMIN — Medication 6 MILLIGRAM(S): at 05:57

## 2023-01-30 RX ADMIN — AZITHROMYCIN 255 MILLIGRAM(S): 500 TABLET, FILM COATED ORAL at 20:48

## 2023-01-30 RX ADMIN — ALBUTEROL 2 PUFF(S): 90 AEROSOL, METERED ORAL at 17:22

## 2023-01-30 RX ADMIN — CEFTRIAXONE 100 MILLIGRAM(S): 500 INJECTION, POWDER, FOR SOLUTION INTRAMUSCULAR; INTRAVENOUS at 05:44

## 2023-01-30 NOTE — DIETITIAN INITIAL EVALUATION ADULT - OTHER INFO
Pt on isolation precautions for COVID; unable to interview pt, with confusion per chart.  Pt with PMHx HTN, PD, COPD, lung cancer, s/p radiation now with trach capped at home. Pt presents with SOB, weakness, poor appetite; admitted for COVID pna, dehydration.  Per H&P, wife confirms pt eats pureed diet at home. Pt currently on regular consistency and per RN, pt tolerating regular consistency well with no issues reported; consuming mostly 26-75% of meals per nursing staff; will change to easy to chew for ease; recommend swallow evaluation with SLP. Pt on isolation precautions for COVID; unable to interview pt, with confusion per chart.  Pt with PMHx HTN, PD, COPD, lung cancer, s/p radiation now with trach capped at home. Pt presents with SOB, weakness, poor appetite; admitted for COVID pna, dehydration.  Per H&P, wife confirms pt eats pureed diet at home. Pt currently on regular consistency and per RN, pt tolerating/managing regular consistency well with no issues reported; consuming mostly 26-75% of meals per nursing staff; will change to easy to chew consistency for ease. Recommend swallow evaluation with SLP.

## 2023-01-30 NOTE — PROGRESS NOTE ADULT - ASSESSMENT
87 year old male PMH HTN, PD, Copd, lung ca, sp radiation and now with trach capped at home, covid pos as of 1/21/23 not vaccinated. Now presents with sob, poor appetite, very weak. Covid positive       Plan: Admit to medicine for CAP and dehydration. Will start IVF and Ceftriaxone and Zithromax. CTA notes no PE, RLL infiltrate is confirmed. Normally   family removed trach cap at night and replaces in AM. Has FE on arrival with creatinine 1.58, follow repeat in AM. D-dimer is elevated 820, meets  criteria for Lovenox 1 mg/kg bid. RA sat normal at 97 %, hold on Remdiziver/Dedadron. Wife confirms her eats puree diet.      Acute bacterial PNA and COVID19  (1/28) Will start pt on decadron due to clinical exam. C/W monitoring resp status. Trach care. C/W abx  Pt eval: DAVON  (1/29) C/W abx. Trach care. C/W steroids  (1/30) C/W abx for PNA. PT reccomended DAVON. Discharge planning    Continue home meds of Ramipril, ASA, Lipitor, Sinemet and Albuterol at home doses.

## 2023-01-30 NOTE — PROGRESS NOTE ADULT - SUBJECTIVE AND OBJECTIVE BOX
Patient is a 87y old  Male who presents with a chief complaint of PNEUMONIA DUE TO COVID-19     (30 Jan 2023 15:20)    INTERVAL HPI/OVERNIGHT EVENTS: Patients seen and examined at bedside this morning. No acute events overnight. Pt reports productive cough.     MEDICATIONS  (STANDING):  albuterol    90 MICROgram(s) HFA Inhaler 2 Puff(s) Inhalation every 12 hours  amLODIPine   Tablet 10 milliGRAM(s) Oral at bedtime  aspirin enteric coated 81 milliGRAM(s) Oral daily  atorvastatin 20 milliGRAM(s) Oral at bedtime  azithromycin  IVPB      azithromycin  IVPB 500 milliGRAM(s) IV Intermittent every 24 hours  carbidopa/levodopa  25/100 1 Tablet(s) Oral <User Schedule>  cefTRIAXone   IVPB 1000 milliGRAM(s) IV Intermittent every 24 hours  dexAMETHasone     Tablet 6 milliGRAM(s) Oral daily  enoxaparin Injectable 60 milliGRAM(s) SubCutaneous every 12 hours  lisinopril 20 milliGRAM(s) Oral daily    MEDICATIONS  (PRN):  guaifenesin/dextromethorphan Oral Liquid 10 milliLiter(s) Oral every 4 hours PRN Cough    Allergies    No Known Allergies    Intolerances      REVIEW OF SYSTEMS:  All other systems reviewed and are negative    Vital Signs Last 24 Hrs  T(C): 36.6 (30 Jan 2023 17:22), Max: 36.8 (29 Jan 2023 18:06)  T(F): 97.8 (30 Jan 2023 17:22), Max: 98.2 (29 Jan 2023 18:06)  HR: 78 (30 Jan 2023 17:22) (60 - 78)  BP: 181/82 (30 Jan 2023 17:22) (138/69 - 181/82)  BP(mean): --  RR: 19 (30 Jan 2023 15:10) (18 - 19)  SpO2: 92% (30 Jan 2023 17:22) (92% - 95%)    Parameters below as of 30 Jan 2023 15:10  Patient On (Oxygen Delivery Method): tracheostomy collar      Daily     Daily   I&O's Summary    29 Jan 2023 07:01  -  30 Jan 2023 07:00  --------------------------------------------------------  IN: 950 mL / OUT: 5 mL / NET: 945 mL      CAPILLARY BLOOD GLUCOSE        PHYSICAL EXAM:  GENERAL: NAD,, no fevers, cough or CP. Trach in place. Capped  ENMT: mucous membranes moist  NECK: supple, No JVD  CHEST/LUNG: ronchi b/l throughout  HEART: normal S1, S2  ABDOMEN: BS+, soft, ND, NT   EXTREMITIES:  pulses palpable; no clubbing, cyanosis, or edema b/l LEs  NEURO: awake, alert, interactive; moves all extremities      Labs                          14.4   7.71  )-----------( 141      ( 30 Jan 2023 05:20 )             44.2     01-30    141  |  110<H>  |  29<H>  ----------------------------<  114<H>  3.5   |  23  |  0.94    Ca    8.6      30 Jan 2023 05:20                              Radiology and Imaging reviewed.

## 2023-01-30 NOTE — DIETITIAN INITIAL EVALUATION ADULT - PERTINENT MEDS FT
MEDICATIONS  (STANDING):  albuterol    90 MICROgram(s) HFA Inhaler 2 Puff(s) Inhalation every 12 hours  aspirin enteric coated 81 milliGRAM(s) Oral daily  atorvastatin 20 milliGRAM(s) Oral at bedtime  azithromycin  IVPB      azithromycin  IVPB 500 milliGRAM(s) IV Intermittent every 24 hours  carbidopa/levodopa  25/100 1 Tablet(s) Oral <User Schedule>  cefTRIAXone   IVPB 1000 milliGRAM(s) IV Intermittent every 24 hours  dexAMETHasone     Tablet 6 milliGRAM(s) Oral daily  enoxaparin Injectable 60 milliGRAM(s) SubCutaneous every 12 hours  lisinopril 20 milliGRAM(s) Oral daily    MEDICATIONS  (PRN):  guaifenesin/dextromethorphan Oral Liquid 10 milliLiter(s) Oral every 4 hours PRN Cough

## 2023-01-30 NOTE — DIETITIAN INITIAL EVALUATION ADULT - PERTINENT LABORATORY DATA
01-30    141  |  110<H>  |  29<H>  ----------------------------<  114<H>  3.5   |  23  |  0.94    Ca    8.6      30 Jan 2023 05:20

## 2023-01-31 LAB
ANION GAP SERPL CALC-SCNC: 7 MMOL/L — SIGNIFICANT CHANGE UP (ref 5–17)
BUN SERPL-MCNC: 25 MG/DL — HIGH (ref 7–23)
CALCIUM SERPL-MCNC: 8.4 MG/DL — LOW (ref 8.5–10.1)
CHLORIDE SERPL-SCNC: 104 MMOL/L — SIGNIFICANT CHANGE UP (ref 96–108)
CO2 SERPL-SCNC: 29 MMOL/L — SIGNIFICANT CHANGE UP (ref 22–31)
CREAT SERPL-MCNC: 0.9 MG/DL — SIGNIFICANT CHANGE UP (ref 0.5–1.3)
EGFR: 84 ML/MIN/1.73M2 — SIGNIFICANT CHANGE UP
GLUCOSE SERPL-MCNC: 124 MG/DL — HIGH (ref 70–99)
HCT VFR BLD CALC: 45.8 % — SIGNIFICANT CHANGE UP (ref 39–50)
HGB BLD-MCNC: 15.2 G/DL — SIGNIFICANT CHANGE UP (ref 13–17)
MCHC RBC-ENTMCNC: 27.7 PG — SIGNIFICANT CHANGE UP (ref 27–34)
MCHC RBC-ENTMCNC: 33.2 G/DL — SIGNIFICANT CHANGE UP (ref 32–36)
MCV RBC AUTO: 83.4 FL — SIGNIFICANT CHANGE UP (ref 80–100)
NRBC # BLD: 0 /100 WBCS — SIGNIFICANT CHANGE UP (ref 0–0)
PLATELET # BLD AUTO: 163 K/UL — SIGNIFICANT CHANGE UP (ref 150–400)
POTASSIUM SERPL-MCNC: 3 MMOL/L — LOW (ref 3.5–5.3)
POTASSIUM SERPL-SCNC: 3 MMOL/L — LOW (ref 3.5–5.3)
RBC # BLD: 5.49 M/UL — SIGNIFICANT CHANGE UP (ref 4.2–5.8)
RBC # FLD: 14.4 % — SIGNIFICANT CHANGE UP (ref 10.3–14.5)
SODIUM SERPL-SCNC: 140 MMOL/L — SIGNIFICANT CHANGE UP (ref 135–145)
WBC # BLD: 10.46 K/UL — SIGNIFICANT CHANGE UP (ref 3.8–10.5)
WBC # FLD AUTO: 10.46 K/UL — SIGNIFICANT CHANGE UP (ref 3.8–10.5)

## 2023-01-31 PROCEDURE — 99232 SBSQ HOSP IP/OBS MODERATE 35: CPT

## 2023-01-31 RX ORDER — SENNA PLUS 8.6 MG/1
2 TABLET ORAL AT BEDTIME
Refills: 0 | Status: DISCONTINUED | OUTPATIENT
Start: 2023-01-31 | End: 2023-02-03

## 2023-01-31 RX ORDER — POTASSIUM CHLORIDE 20 MEQ
40 PACKET (EA) ORAL
Refills: 0 | Status: COMPLETED | OUTPATIENT
Start: 2023-01-31 | End: 2023-01-31

## 2023-01-31 RX ADMIN — Medication 40 MILLIEQUIVALENT(S): at 12:57

## 2023-01-31 RX ADMIN — AZITHROMYCIN 255 MILLIGRAM(S): 500 TABLET, FILM COATED ORAL at 18:47

## 2023-01-31 RX ADMIN — AMLODIPINE BESYLATE 10 MILLIGRAM(S): 2.5 TABLET ORAL at 21:35

## 2023-01-31 RX ADMIN — CEFTRIAXONE 100 MILLIGRAM(S): 500 INJECTION, POWDER, FOR SOLUTION INTRAMUSCULAR; INTRAVENOUS at 06:00

## 2023-01-31 RX ADMIN — LISINOPRIL 20 MILLIGRAM(S): 2.5 TABLET ORAL at 06:02

## 2023-01-31 RX ADMIN — CARBIDOPA AND LEVODOPA 1 TABLET(S): 25; 100 TABLET ORAL at 08:40

## 2023-01-31 RX ADMIN — ENOXAPARIN SODIUM 60 MILLIGRAM(S): 100 INJECTION SUBCUTANEOUS at 18:47

## 2023-01-31 RX ADMIN — ATORVASTATIN CALCIUM 20 MILLIGRAM(S): 80 TABLET, FILM COATED ORAL at 21:35

## 2023-01-31 RX ADMIN — Medication 81 MILLIGRAM(S): at 11:27

## 2023-01-31 RX ADMIN — CARBIDOPA AND LEVODOPA 1 TABLET(S): 25; 100 TABLET ORAL at 21:36

## 2023-01-31 RX ADMIN — ENOXAPARIN SODIUM 60 MILLIGRAM(S): 100 INJECTION SUBCUTANEOUS at 05:59

## 2023-01-31 RX ADMIN — Medication 6 MILLIGRAM(S): at 06:00

## 2023-01-31 RX ADMIN — Medication 40 MILLIEQUIVALENT(S): at 15:28

## 2023-01-31 RX ADMIN — ALBUTEROL 2 PUFF(S): 90 AEROSOL, METERED ORAL at 18:47

## 2023-01-31 RX ADMIN — SENNA PLUS 2 TABLET(S): 8.6 TABLET ORAL at 21:35

## 2023-01-31 RX ADMIN — ALBUTEROL 2 PUFF(S): 90 AEROSOL, METERED ORAL at 06:02

## 2023-01-31 NOTE — PROGRESS NOTE ADULT - ASSESSMENT
87 year old male PMH HTN, PD, Copd, lung ca, sp radiation and now with trach capped at home, covid pos as of 1/21/23 not vaccinated. Now presents with sob, poor appetite, very weak. Covid positive       Plan: Admit to medicine for CAP and dehydration. Will start IVF and Ceftriaxone and Zithromax. CTA notes no PE, RLL infiltrate is confirmed. Normally   family removed trach cap at night and replaces in AM. Has FE on arrival with creatinine 1.58, follow repeat in AM. D-dimer is elevated 820, meets  criteria for Lovenox 1 mg/kg bid. RA sat normal at 97 %, hold on Remdiziver/Dedadron. Wife confirms her eats puree diet.      Acute bacterial PNA and COVID19  (1/28) Will start pt on decadron due to clinical exam. C/W monitoring resp status. Trach care. C/W abx  Pt eval: DAVON  (1/29) C/W abx. Trach care. C/W steroids  (1/30) C/W abx for PNA. PT reccomended DAVON. Discharge planning    Continue home meds of Ramipril, ASA, Lipitor, Sinemet and Albuterol at home doses.         87 year old male PMH HTN, PD, Copd, lung ca, sp radiation and now with trach capped at home, covid pos as of 1/21/23 not vaccinated. Now presents with sob, poor appetite, very weak. Covid positive       Plan: Admit to medicine for CAP and dehydration. Will start IVF and Ceftriaxone and Zithromax. CTA notes no PE, RLL infiltrate is confirmed. Normally   family removed trach cap at night and replaces in AM. Has FE on arrival with creatinine 1.58, follow repeat in AM. D-dimer is elevated 820, meets  criteria for Lovenox 1 mg/kg bid. RA sat normal at 97 %, hold on Remdiziver/Dedadron. Wife confirms her eats puree diet.      Acute bacterial PNA and COVID19  (1/28) Will start pt on decadron due to clinical exam. C/W monitoring resp status. Trach care. C/W abx  Pt eval: DAVON  (1/29) C/W abx. Trach care. C/W steroids  (1/30) C/W abx for PNA. PT reccomended DAVON. Discharge planning  (1/31) C/W abx for PNA. C/W dexamethasone. Discharge planning Titrate oxygen.    Continue home meds of Ramipril, ASA, Lipitor, Sinemet and Albuterol at home doses.

## 2023-01-31 NOTE — PROGRESS NOTE ADULT - SUBJECTIVE AND OBJECTIVE BOX
Patient is a 87y old  Male who presents with a chief complaint of Weakness from COVID and pneumonia. (30 Jan 2023 17:28)    INTERVAL HPI/OVERNIGHT EVENTS: Patients seen and examined at bedside this morning. No acute events overnight. Pt on trach collar at bedside with oxygen supplementation.    MEDICATIONS  (STANDING):  albuterol    90 MICROgram(s) HFA Inhaler 2 Puff(s) Inhalation every 12 hours  amLODIPine   Tablet 10 milliGRAM(s) Oral at bedtime  aspirin enteric coated 81 milliGRAM(s) Oral daily  atorvastatin 20 milliGRAM(s) Oral at bedtime  azithromycin  IVPB      azithromycin  IVPB 500 milliGRAM(s) IV Intermittent every 24 hours  carbidopa/levodopa  25/100 1 Tablet(s) Oral <User Schedule>  cefTRIAXone   IVPB 1000 milliGRAM(s) IV Intermittent every 24 hours  dexAMETHasone     Tablet 6 milliGRAM(s) Oral daily  enoxaparin Injectable 60 milliGRAM(s) SubCutaneous every 12 hours  lisinopril 20 milliGRAM(s) Oral daily  potassium chloride   Powder 40 milliEquivalent(s) Oral every 2 hours  senna 2 Tablet(s) Oral at bedtime    MEDICATIONS  (PRN):  guaifenesin/dextromethorphan Oral Liquid 10 milliLiter(s) Oral every 4 hours PRN Cough    Allergies    No Known Allergies    Intolerances      REVIEW OF SYSTEMS:  All other systems reviewed and are negative    Vital Signs Last 24 Hrs  T(C): 36.6 (31 Jan 2023 11:18), Max: 36.6 (30 Jan 2023 17:22)  T(F): 97.8 (31 Jan 2023 11:18), Max: 97.8 (30 Jan 2023 17:22)  HR: 72 (31 Jan 2023 11:18) (60 - 78)  BP: 135/66 (31 Jan 2023 11:18) (116/77 - 181/82)  BP(mean): --  RR: 17 (31 Jan 2023 11:18) (16 - 19)  SpO2: 97% (31 Jan 2023 11:18) (92% - 97%)    Parameters below as of 31 Jan 2023 11:18  Patient On (Oxygen Delivery Method): tracheostomy collar  O2 Flow (L/min): 28  O2 Concentration (%): 6  Daily     Daily   I&O's Summary    31 Jan 2023 07:01  -  31 Jan 2023 15:00  --------------------------------------------------------  IN: 50 mL / OUT: 0 mL / NET: 50 mL      CAPILLARY BLOOD GLUCOSE        PHYSICAL EXAM:  GENERAL: NAD,, no fevers, cough or CP. Trach in place. Capped  ENMT: mucous membranes moist  NECK: supple, No JVD  CHEST/LUNG: ronchi b/l throughout  HEART: normal S1, S2  ABDOMEN: BS+, soft, ND, NT   EXTREMITIES:  pulses palpable; no clubbing, cyanosis, or edema b/l LEs  NEURO: awake, alert, interactive; moves all extremities    Labs                          15.2   10.46 )-----------( 163      ( 31 Jan 2023 05:57 )             45.8     01-31    140  |  104  |  25<H>  ----------------------------<  124<H>  3.0<L>   |  29  |  0.90    Ca    8.4<L>      31 Jan 2023 05:57                              Radiology and Imaging reviewed. Patient is a 87y old  Male who presents with a chief complaint of Weakness from COVID and pneumonia. (30 Jan 2023 17:28)    INTERVAL HPI/OVERNIGHT EVENTS: Patients seen and examined at bedside this morning. No acute events overnight. Pt on trach collar at bedside with oxygen supplementation 28% 6L.     MEDICATIONS  (STANDING):  albuterol    90 MICROgram(s) HFA Inhaler 2 Puff(s) Inhalation every 12 hours  amLODIPine   Tablet 10 milliGRAM(s) Oral at bedtime  aspirin enteric coated 81 milliGRAM(s) Oral daily  atorvastatin 20 milliGRAM(s) Oral at bedtime  azithromycin  IVPB      azithromycin  IVPB 500 milliGRAM(s) IV Intermittent every 24 hours  carbidopa/levodopa  25/100 1 Tablet(s) Oral <User Schedule>  cefTRIAXone   IVPB 1000 milliGRAM(s) IV Intermittent every 24 hours  dexAMETHasone     Tablet 6 milliGRAM(s) Oral daily  enoxaparin Injectable 60 milliGRAM(s) SubCutaneous every 12 hours  lisinopril 20 milliGRAM(s) Oral daily  potassium chloride   Powder 40 milliEquivalent(s) Oral every 2 hours  senna 2 Tablet(s) Oral at bedtime    MEDICATIONS  (PRN):  guaifenesin/dextromethorphan Oral Liquid 10 milliLiter(s) Oral every 4 hours PRN Cough    Allergies    No Known Allergies    Intolerances      REVIEW OF SYSTEMS:  All other systems reviewed and are negative    Vital Signs Last 24 Hrs  T(C): 36.6 (31 Jan 2023 11:18), Max: 36.6 (30 Jan 2023 17:22)  T(F): 97.8 (31 Jan 2023 11:18), Max: 97.8 (30 Jan 2023 17:22)  HR: 72 (31 Jan 2023 11:18) (60 - 78)  BP: 135/66 (31 Jan 2023 11:18) (116/77 - 181/82)  BP(mean): --  RR: 17 (31 Jan 2023 11:18) (16 - 19)  SpO2: 97% (31 Jan 2023 11:18) (92% - 97%)    Parameters below as of 31 Jan 2023 11:18  Patient On (Oxygen Delivery Method): tracheostomy collar  O2 Flow (L/min): 28  O2 Concentration (%): 6  Daily     Daily   I&O's Summary    31 Jan 2023 07:01  -  31 Jan 2023 15:00  --------------------------------------------------------  IN: 50 mL / OUT: 0 mL / NET: 50 mL      CAPILLARY BLOOD GLUCOSE        PHYSICAL EXAM:  GENERAL: NAD,, no fevers, cough or CP. Trach in place. Capped  ENMT: mucous membranes moist  NECK: supple, No JVD  CHEST/LUNG: ronchi b/l throughout  HEART: normal S1, S2  ABDOMEN: BS+, soft, ND, NT   EXTREMITIES:  pulses palpable; no clubbing, cyanosis, or edema b/l LEs  NEURO: awake, alert, interactive; moves all extremities    Labs                          15.2   10.46 )-----------( 163      ( 31 Jan 2023 05:57 )             45.8     01-31    140  |  104  |  25<H>  ----------------------------<  124<H>  3.0<L>   |  29  |  0.90    Ca    8.4<L>      31 Jan 2023 05:57                              Radiology and Imaging reviewed.

## 2023-02-01 DIAGNOSIS — G20 PARKINSON'S DISEASE: ICD-10-CM

## 2023-02-01 DIAGNOSIS — I10 ESSENTIAL (PRIMARY) HYPERTENSION: ICD-10-CM

## 2023-02-01 DIAGNOSIS — J44.9 CHRONIC OBSTRUCTIVE PULMONARY DISEASE, UNSPECIFIED: ICD-10-CM

## 2023-02-01 DIAGNOSIS — Z98.890 OTHER SPECIFIED POSTPROCEDURAL STATES: ICD-10-CM

## 2023-02-01 DIAGNOSIS — U07.1 COVID-19: ICD-10-CM

## 2023-02-01 DIAGNOSIS — J18.9 PNEUMONIA, UNSPECIFIED ORGANISM: ICD-10-CM

## 2023-02-01 LAB
ANION GAP SERPL CALC-SCNC: 8 MMOL/L — SIGNIFICANT CHANGE UP (ref 5–17)
BUN SERPL-MCNC: 36 MG/DL — HIGH (ref 7–23)
CALCIUM SERPL-MCNC: 8.3 MG/DL — LOW (ref 8.5–10.1)
CHLORIDE SERPL-SCNC: 107 MMOL/L — SIGNIFICANT CHANGE UP (ref 96–108)
CO2 SERPL-SCNC: 26 MMOL/L — SIGNIFICANT CHANGE UP (ref 22–31)
CREAT SERPL-MCNC: 1.07 MG/DL — SIGNIFICANT CHANGE UP (ref 0.5–1.3)
EGFR: 68 ML/MIN/1.73M2 — SIGNIFICANT CHANGE UP
GLUCOSE SERPL-MCNC: 136 MG/DL — HIGH (ref 70–99)
POTASSIUM SERPL-MCNC: 3.9 MMOL/L — SIGNIFICANT CHANGE UP (ref 3.5–5.3)
POTASSIUM SERPL-SCNC: 3.9 MMOL/L — SIGNIFICANT CHANGE UP (ref 3.5–5.3)
SODIUM SERPL-SCNC: 141 MMOL/L — SIGNIFICANT CHANGE UP (ref 135–145)

## 2023-02-01 PROCEDURE — 99233 SBSQ HOSP IP/OBS HIGH 50: CPT

## 2023-02-01 RX ORDER — CARBIDOPA AND LEVODOPA 25; 100 MG/1; MG/1
1 TABLET ORAL
Refills: 0 | Status: DISCONTINUED | OUTPATIENT
Start: 2023-02-01 | End: 2023-02-03

## 2023-02-01 RX ADMIN — ENOXAPARIN SODIUM 60 MILLIGRAM(S): 100 INJECTION SUBCUTANEOUS at 17:40

## 2023-02-01 RX ADMIN — ENOXAPARIN SODIUM 60 MILLIGRAM(S): 100 INJECTION SUBCUTANEOUS at 05:31

## 2023-02-01 RX ADMIN — CARBIDOPA AND LEVODOPA 1 TABLET(S): 25; 100 TABLET ORAL at 08:53

## 2023-02-01 RX ADMIN — ALBUTEROL 2 PUFF(S): 90 AEROSOL, METERED ORAL at 05:31

## 2023-02-01 RX ADMIN — SENNA PLUS 2 TABLET(S): 8.6 TABLET ORAL at 21:01

## 2023-02-01 RX ADMIN — CEFTRIAXONE 100 MILLIGRAM(S): 500 INJECTION, POWDER, FOR SOLUTION INTRAMUSCULAR; INTRAVENOUS at 05:32

## 2023-02-01 RX ADMIN — LISINOPRIL 20 MILLIGRAM(S): 2.5 TABLET ORAL at 05:32

## 2023-02-01 RX ADMIN — AZITHROMYCIN 255 MILLIGRAM(S): 500 TABLET, FILM COATED ORAL at 17:40

## 2023-02-01 RX ADMIN — AMLODIPINE BESYLATE 10 MILLIGRAM(S): 2.5 TABLET ORAL at 21:02

## 2023-02-01 RX ADMIN — Medication 81 MILLIGRAM(S): at 11:49

## 2023-02-01 RX ADMIN — ATORVASTATIN CALCIUM 20 MILLIGRAM(S): 80 TABLET, FILM COATED ORAL at 21:01

## 2023-02-01 RX ADMIN — CARBIDOPA AND LEVODOPA 1 TABLET(S): 25; 100 TABLET ORAL at 21:00

## 2023-02-01 RX ADMIN — Medication 6 MILLIGRAM(S): at 05:32

## 2023-02-01 RX ADMIN — ALBUTEROL 2 PUFF(S): 90 AEROSOL, METERED ORAL at 17:41

## 2023-02-01 NOTE — PROGRESS NOTE ADULT - ASSESSMENT
87 year old male PMH HTN, PD, Copd, lung ca, sp radiation and now with trach capped at home, covid pos as of 1/21/23 not vaccinated. Now presents with sob, poor appetite, very weak. Covid positive       Plan: Admit to medicine for CAP and dehydration. Will start IVF and Ceftriaxone and Zithromax. CTA notes no PE, RLL infiltrate is confirmed. Normally   family removed trach cap at night and replaces in AM. Has FE on arrival with creatinine 1.58, follow repeat in AM. D-dimer is elevated 820, meets  criteria for Lovenox 1 mg/kg bid. RA sat normal at 97 %, hold on Remdiziver/Dedadron. Wife confirms her eats puree diet.      Acute bacterial PNA and COVID19  (1/28) Will start pt on decadron due to clinical exam. C/W monitoring resp status. Trach care. C/W abx  Pt eval: DAVON  (1/29) C/W abx. Trach care. C/W steroids  (1/30) C/W abx for PNA. PT reccomended DAVON. Discharge planning  (1/31) C/W abx for PNA. C/W dexamethasone. Discharge planning Titrate oxygen.  (2/1) C/W abx for PNA (till 2/3). C/W dexamethasone. Discharge planning. Plan to go to [facility] with his wife. Awaiting auth    Continue home meds of Ramipril, ASA, Lipitor, Sinemet and Albuterol at home doses.

## 2023-02-01 NOTE — PROGRESS NOTE ADULT - SUBJECTIVE AND OBJECTIVE BOX
Patient is a 87y old  Male who presents with a chief complaint of Weakness from COVID and pneumonia. (31 Jan 2023 15:00)      INTERVAL HPI/OVERNIGHT EVENTS:  pt is on trach collar with humidifier, but he took of a cannula and trach collar. Wife next to him put it back. Son Cody Sage was at the bedside.      REVIEW OF SYSTEMS:  Due to trach, he cannot talk,    FAMILY HISTORY:    Vital Signs Last 24 Hrs  T(C): 36.4 (01 Feb 2023 11:34), Max: 36.8 (31 Jan 2023 17:25)  T(F): 97.6 (01 Feb 2023 11:34), Max: 98.3 (31 Jan 2023 17:25)  HR: 91 (01 Feb 2023 16:45) (58 - 103)  BP: 103/68 (01 Feb 2023 15:22) (101/68 - 115/79)  BP(mean): --  RR: 20 (01 Feb 2023 16:45) (16 - 20)  SpO2: 97% (01 Feb 2023 16:45) (94% - 99%)    Parameters below as of 01 Feb 2023 16:45  Patient On (Oxygen Delivery Method): tracheostomy collar  O2 Flow (L/min): 28  O2 Concentration (%): 6    01-31-23 @ 07:01  -  02-01-23 @ 07:00  --------------------------------------------------------  IN: 100 mL / OUT: 0 mL / NET: 100 mL        PHYSICAL EXAM:  GENERAL: NAD, well-groomed, well-developed  HEAD:  Atraumatic, Normocephalic  EYES: EOMI, conjunctiva and sclera clear  ENMT: No tonsillar erythema, exudates, or enlargement; Moist mucous membranes,  NECK: Supple, +trach  NERVOUS SYSTEM:  Alert & awake, Moving all extremities   CHEST/LUNG: No rales, rhonchi, +wheezing,  HEART: Regular rate and rhythm; No murmurs, rubs, or gallops  ABDOMEN: Soft, Nontender, Nondistended; Bowel sounds present  EXTREMITIES:  2+ Peripheral Pulses, No clubbing, cyanosis, or edema  LYMPH: No lymphadenopathy noted  SKIN: No rashes or lesions    Consultant(s) Notes Reviewed:  [x ] YES  [ ] NO  Care Discussed with Consultants/Other Providers [ x] YES  [ ] NO    LABS:        RADIOLOGY & ADDITIONAL TESTS:  < from: Xray Chest 1 View-PORTABLE IMMEDIATE (01.27.23 @ 12:11) >  COMPARISON: None available.    Heart size normal. Tracheostomy is seen.    There is a slight right base infiltrate.    Mild to moderate bowel distention seen.    < end of copied text >      Imaging Personally Reviewed:  [ ] YES  [ ] NO  albuterol    90 MICROgram(s) HFA Inhaler 2 Puff(s) Inhalation every 12 hours  amLODIPine   Tablet 10 milliGRAM(s) Oral at bedtime  aspirin enteric coated 81 milliGRAM(s) Oral daily  atorvastatin 20 milliGRAM(s) Oral at bedtime  azithromycin  IVPB      azithromycin  IVPB 500 milliGRAM(s) IV Intermittent every 24 hours  carbidopa/levodopa  25/100 1 Tablet(s) Oral <User Schedule>  cefTRIAXone   IVPB 1000 milliGRAM(s) IV Intermittent every 24 hours  dexAMETHasone     Tablet 6 milliGRAM(s) Oral daily  enoxaparin Injectable 60 milliGRAM(s) SubCutaneous every 12 hours  guaifenesin/dextromethorphan Oral Liquid 10 milliLiter(s) Oral every 4 hours PRN  lisinopril 20 milliGRAM(s) Oral daily  senna 2 Tablet(s) Oral at bedtime      HEALTH ISSUES - PROBLEM Dx:

## 2023-02-02 LAB
ALBUMIN SERPL ELPH-MCNC: 2.4 G/DL — LOW (ref 3.3–5)
ALP SERPL-CCNC: 110 U/L — SIGNIFICANT CHANGE UP (ref 40–120)
ALT FLD-CCNC: 33 U/L — SIGNIFICANT CHANGE UP (ref 12–78)
ANION GAP SERPL CALC-SCNC: 6 MMOL/L — SIGNIFICANT CHANGE UP (ref 5–17)
AST SERPL-CCNC: 79 U/L — HIGH (ref 15–37)
BILIRUB SERPL-MCNC: 0.6 MG/DL — SIGNIFICANT CHANGE UP (ref 0.2–1.2)
BUN SERPL-MCNC: 42 MG/DL — HIGH (ref 7–23)
CALCIUM SERPL-MCNC: 8.3 MG/DL — LOW (ref 8.5–10.1)
CHLORIDE SERPL-SCNC: 110 MMOL/L — HIGH (ref 96–108)
CO2 SERPL-SCNC: 28 MMOL/L — SIGNIFICANT CHANGE UP (ref 22–31)
CREAT SERPL-MCNC: 1.06 MG/DL — SIGNIFICANT CHANGE UP (ref 0.5–1.3)
EGFR: 69 ML/MIN/1.73M2 — SIGNIFICANT CHANGE UP
FLUAV AG NPH QL: SIGNIFICANT CHANGE UP
FLUBV AG NPH QL: SIGNIFICANT CHANGE UP
GLUCOSE SERPL-MCNC: 120 MG/DL — HIGH (ref 70–99)
HCT VFR BLD CALC: 46.7 % — SIGNIFICANT CHANGE UP (ref 39–50)
HGB BLD-MCNC: 15 G/DL — SIGNIFICANT CHANGE UP (ref 13–17)
MCHC RBC-ENTMCNC: 27.4 PG — SIGNIFICANT CHANGE UP (ref 27–34)
MCHC RBC-ENTMCNC: 32.1 G/DL — SIGNIFICANT CHANGE UP (ref 32–36)
MCV RBC AUTO: 85.2 FL — SIGNIFICANT CHANGE UP (ref 80–100)
NRBC # BLD: 0 /100 WBCS — SIGNIFICANT CHANGE UP (ref 0–0)
PLATELET # BLD AUTO: 185 K/UL — SIGNIFICANT CHANGE UP (ref 150–400)
POTASSIUM SERPL-MCNC: 4.1 MMOL/L — SIGNIFICANT CHANGE UP (ref 3.5–5.3)
POTASSIUM SERPL-SCNC: 4.1 MMOL/L — SIGNIFICANT CHANGE UP (ref 3.5–5.3)
PROT SERPL-MCNC: 6.2 GM/DL — SIGNIFICANT CHANGE UP (ref 6–8.3)
RBC # BLD: 5.48 M/UL — SIGNIFICANT CHANGE UP (ref 4.2–5.8)
RBC # FLD: 14.6 % — HIGH (ref 10.3–14.5)
SARS-COV-2 RNA SPEC QL NAA+PROBE: DETECTED
SODIUM SERPL-SCNC: 144 MMOL/L — SIGNIFICANT CHANGE UP (ref 135–145)
WBC # BLD: 7.84 K/UL — SIGNIFICANT CHANGE UP (ref 3.8–10.5)
WBC # FLD AUTO: 7.84 K/UL — SIGNIFICANT CHANGE UP (ref 3.8–10.5)

## 2023-02-02 PROCEDURE — 99233 SBSQ HOSP IP/OBS HIGH 50: CPT

## 2023-02-02 RX ADMIN — CARBIDOPA AND LEVODOPA 1 TABLET(S): 25; 100 TABLET ORAL at 08:43

## 2023-02-02 RX ADMIN — CEFTRIAXONE 100 MILLIGRAM(S): 500 INJECTION, POWDER, FOR SOLUTION INTRAMUSCULAR; INTRAVENOUS at 05:12

## 2023-02-02 RX ADMIN — AMLODIPINE BESYLATE 10 MILLIGRAM(S): 2.5 TABLET ORAL at 22:09

## 2023-02-02 RX ADMIN — CARBIDOPA AND LEVODOPA 1 TABLET(S): 25; 100 TABLET ORAL at 22:12

## 2023-02-02 RX ADMIN — ALBUTEROL 2 PUFF(S): 90 AEROSOL, METERED ORAL at 18:30

## 2023-02-02 RX ADMIN — ALBUTEROL 2 PUFF(S): 90 AEROSOL, METERED ORAL at 05:19

## 2023-02-02 RX ADMIN — ENOXAPARIN SODIUM 60 MILLIGRAM(S): 100 INJECTION SUBCUTANEOUS at 05:12

## 2023-02-02 RX ADMIN — Medication 6 MILLIGRAM(S): at 05:12

## 2023-02-02 RX ADMIN — LISINOPRIL 20 MILLIGRAM(S): 2.5 TABLET ORAL at 05:12

## 2023-02-02 RX ADMIN — ATORVASTATIN CALCIUM 20 MILLIGRAM(S): 80 TABLET, FILM COATED ORAL at 22:09

## 2023-02-02 RX ADMIN — Medication 81 MILLIGRAM(S): at 13:47

## 2023-02-02 RX ADMIN — CARBIDOPA AND LEVODOPA 1 TABLET(S): 25; 100 TABLET ORAL at 13:47

## 2023-02-02 RX ADMIN — AZITHROMYCIN 255 MILLIGRAM(S): 500 TABLET, FILM COATED ORAL at 19:56

## 2023-02-02 RX ADMIN — SENNA PLUS 2 TABLET(S): 8.6 TABLET ORAL at 22:09

## 2023-02-02 NOTE — SWALLOW BEDSIDE ASSESSMENT ADULT - SWALLOW EVAL: RECOMMENDED FEEDING/EATING TECHNIQUES
allow for swallow between intakes/check mouth frequently for oral residue/pocketing/crush medication (when feasible)/maintain upright posture during/after eating for 30 mins/no straws/oral hygiene/small sips/bites use speaking valve for all po intake/allow for swallow between intakes/check mouth frequently for oral residue/pocketing/crush medication (when feasible)/maintain upright posture during/after eating for 30 mins/no straws/oral hygiene/small sips/bites

## 2023-02-02 NOTE — SWALLOW BEDSIDE ASSESSMENT ADULT - SWALLOW EVAL: PATIENT/FAMILY GOALS STATEMENT
as per wife pt s/p tracheostomy tube beginning 2016 and dons speaking valve "when he is eating, drinking and speaking" only. wife reported as per pt wishes "he wants to eat" despite last visit to ENT- who stated "may be it's time for a " wife pointed to stomach referring the PEGtube again. wife stated pt had PEGtube "in the beginning". wife has thick it at home.

## 2023-02-02 NOTE — SWALLOW BEDSIDE ASSESSMENT ADULT - COMMENTS
CT angio chest w/ IV contrast1/27/2023 IMPRESSION:Multiple segmental and subsegmental branches in the lower lobes cannot be evaluated due to motion. No pulmonary embolism elsewhere.Right lower lobe pneumonia. Mild groundglass in the left lower lobe, likely infectious or inflammatory. Follow-up in 6 weeks is recommended to assess for change. noted bolus tinged secretions expectorated from trach

## 2023-02-02 NOTE — SWALLOW BEDSIDE ASSESSMENT ADULT - SWALLOW EVAL: DIAGNOSIS
oropharyngeal dysphagia marked by decreased labial seal causing anterior loss with thin liquid, prolonged bolus manipulation and transport posterior- at times oral holding and suspected anterior/posterior movement of bolus, +swallow trigger with hyolaryngeal elevation/excursion and noted multiple swallows. cough/throat clear with thin/mild thick liquid clinically suggestive of laryngeal penetration or aspiration. noted secretions expectorated from tracheostomy tube tinged with thin liquid trialed. oropharyngeal dysphagia marked by decreased labial seal causing anterior loss with thin liquid, prolonged bolus manipulation and transport posterior- at times oral holding and suspected anterior/posterior movement of bolus, +swallow trigger with hyolaryngeal elevation/excursion. noted multiple swallows. cough/throat clear/wet vocal quality clinically suggestive of laryngeal penetration or aspiration. secretions expectorated from tracheostomy tube tinged with thin liquid trialed.

## 2023-02-02 NOTE — SWALLOW BEDSIDE ASSESSMENT ADULT - SLP GENERAL OBSERVATIONS
pt seen in 1B on 6l trach collar, alert and oriented to self- place with choice. pt engaged with SLP for assessment, responding to simple questions, he verbalized wants and was able to follow directions with repetition. pt seen in 1B on 6l trach collar, alert and oriented to self- place with choice. pt engaged with SLP for assessment, responding to simple questions, he verbalized wants and was able to follow directions with repetition. pt pleasantly confused and restless. speech intelligibility fair for short simple utterances 2/2 hypokinetic dysarthria zuhair by low volume, decreased articulatory precision and reduced breath support coordination with phonation. PMV 2001 gretchen ames (purple) speaking valve in place during assessment and po trials

## 2023-02-02 NOTE — PROGRESS NOTE ADULT - SUBJECTIVE AND OBJECTIVE BOX
Patient is a 87y old  Male who presents with a chief complaint of Weakness from COVID and pneumonia. (01 Feb 2023 17:08)      INTERVAL HPI/OVERNIGHT EVENTS:  pt has no distress. cannot get spo2 likely due to coldness on his hands.      REVIEW OF SYSTEMS:  CONSTITUTIONAL: No fever, weight loss, or fatigue  EYES: No eye pain, visual disturbances, or discharge  ENMT:  No difficulty hearing, tinnitus, vertigo; No sinus or throat pain  NECK: No pain or stiffness  BREASTS: No pain, masses, or nipple discharge  RESPIRATORY: No cough, wheezing, chills or hemoptysis; No shortness of breath  CARDIOVASCULAR: No chest pain, palpitations, dizziness, or leg swelling  GASTROINTESTINAL: No abdominal or epigastric pain. No nausea, vomiting, or hematemesis; No diarrhea or constipation. No melena or hematochezia.  GENITOURINARY: No dysuria, frequency, hematuria, or incontinence  NEUROLOGICAL: No headaches, memory loss, loss of strength, numbness, or tremors  SKIN: No itching, burning, rashes, or lesions   LYMPH NODES: No enlarged glands  ENDOCRINE: No heat or cold intolerance; No hair loss  MUSCULOSKELETAL: No joint pain or swelling; No muscle, back, or extremity pain  HEME/LYMPH: No easy bruising, or bleeding gums  ALLERY AND IMMUNOLOGIC: No hives or eczema    FAMILY HISTORY:    Vital Signs Last 24 Hrs  T(C): 36.7 (02 Feb 2023 11:03), Max: 36.7 (02 Feb 2023 11:03)  T(F): 98 (02 Feb 2023 11:03), Max: 98 (02 Feb 2023 11:03)  HR: 77 (02 Feb 2023 11:03) (77 - 97)  BP: 102/62 (02 Feb 2023 11:03) (95/60 - 116/84)  BP(mean): 75 (01 Feb 2023 21:09) (75 - 75)  RR: 20 (02 Feb 2023 11:03) (17 - 20)  SpO2: 94% (02 Feb 2023 11:03) (93% - 97%)    Parameters below as of 02 Feb 2023 11:03  Patient On (Oxygen Delivery Method): tracheostomy collar          PHYSICAL EXAM:  GENERAL: NAD, well-groomed, well-developed  HEAD:  Atraumatic, Normocephalic  EYES: EOMI, PERRLA, conjunctiva and sclera clear  ENMT: No tonsillar erythema, exudates, or enlargement; Moist mucous membranes, Good dentition, No lesions  NECK: Supple, No JVD, Normal thyroid  NERVOUS SYSTEM:  Alert & Oriented X3, Good concentration; Motor Strength 5/5 B/L upper and lower extremities; DTRs 2+ intact and symmetric  CHEST/LUNG: Clear to percussion bilaterally; No rales, rhonchi, wheezing, or rubs  HEART: Regular rate and rhythm; No murmurs, rubs, or gallops  ABDOMEN: Soft, Nontender, Nondistended; Bowel sounds present  EXTREMITIES:  2+ Peripheral Pulses, No clubbing, cyanosis, or edema  LYMPH: No lymphadenopathy noted  SKIN: No rashes or lesions    Consultant(s) Notes Reviewed:  [x ] YES  [ ] NO  Care Discussed with Consultants/Other Providers [ x] YES  [ ] NO    LABS:        RADIOLOGY & ADDITIONAL TESTS:    Imaging Personally Reviewed:  [ ] YES  [ ] NO  albuterol    90 MICROgram(s) HFA Inhaler 2 Puff(s) Inhalation every 12 hours  amLODIPine   Tablet 10 milliGRAM(s) Oral at bedtime  aspirin enteric coated 81 milliGRAM(s) Oral daily  atorvastatin 20 milliGRAM(s) Oral at bedtime  azithromycin  IVPB      azithromycin  IVPB 500 milliGRAM(s) IV Intermittent every 24 hours  carbidopa/levodopa  25/100 1 Tablet(s) Oral <User Schedule>  cefTRIAXone   IVPB 1000 milliGRAM(s) IV Intermittent every 24 hours  dexAMETHasone     Tablet 6 milliGRAM(s) Oral daily  enoxaparin Injectable 60 milliGRAM(s) SubCutaneous every 12 hours  guaifenesin/dextromethorphan Oral Liquid 10 milliLiter(s) Oral every 4 hours PRN  lisinopril 20 milliGRAM(s) Oral daily  senna 2 Tablet(s) Oral at bedtime      HEALTH ISSUES - PROBLEM Dx:  CAP (community acquired pneumonia)    COVID-19 virus infection    HTN (hypertension)    Parkinson disease    Chronic obstructive pulmonary disease (COPD)    H/O tracheostomy           Patient is a 87y old  Male who presents with a chief complaint of Weakness from COVID and pneumonia. (01 Feb 2023 17:08)      INTERVAL HPI/OVERNIGHT EVENTS:  pt has no distress. cannot get spo2 likely due to coldness on his hands.      REVIEW OF SYSTEMS:  pt cannot talk due to trach     FAMILY HISTORY:    Vital Signs Last 24 Hrs  T(C): 36.7 (02 Feb 2023 11:03), Max: 36.7 (02 Feb 2023 11:03)  T(F): 98 (02 Feb 2023 11:03), Max: 98 (02 Feb 2023 11:03)  HR: 77 (02 Feb 2023 11:03) (77 - 97)  BP: 102/62 (02 Feb 2023 11:03) (95/60 - 116/84)  BP(mean): 75 (01 Feb 2023 21:09) (75 - 75)  RR: 20 (02 Feb 2023 11:03) (17 - 20)  SpO2: 94% (02 Feb 2023 11:03) (93% - 97%)    Parameters below as of 02 Feb 2023 11:03  Patient On (Oxygen Delivery Method): tracheostomy collar      PHYSICAL EXAM:  GENERAL: NAD, well-groomed, well-developed  HEAD:  Atraumatic, Normocephalic  EYES: EOMI, conjunctiva and sclera clear  ENMT: No tonsillar erythema, exudates, or enlargement; Moist mucous membranes,  NECK: Supple, +trach  NERVOUS SYSTEM:  Alert & awake, Moving all extremities   CHEST/LUNG: No rales, rhonchi, +wheezing,  HEART: Regular rate and rhythm; No murmurs, rubs, or gallops  ABDOMEN: Soft, Nontender, Nondistended; Bowel sounds present  EXTREMITIES:  2+ Peripheral Pulses, No clubbing, cyanosis, or edema  LYMPH: No lymphadenopathy noted  SKIN: No rashes or lesions      Consultant(s) Notes Reviewed:  [x ] YES  [ ] NO  Care Discussed with Consultants/Other Providers [ x] YES  [ ] NO    LABS:        RADIOLOGY & ADDITIONAL TESTS:    Imaging Personally Reviewed:  [ ] YES  [ ] NO  albuterol    90 MICROgram(s) HFA Inhaler 2 Puff(s) Inhalation every 12 hours  amLODIPine   Tablet 10 milliGRAM(s) Oral at bedtime  aspirin enteric coated 81 milliGRAM(s) Oral daily  atorvastatin 20 milliGRAM(s) Oral at bedtime  azithromycin  IVPB      azithromycin  IVPB 500 milliGRAM(s) IV Intermittent every 24 hours  carbidopa/levodopa  25/100 1 Tablet(s) Oral <User Schedule>  cefTRIAXone   IVPB 1000 milliGRAM(s) IV Intermittent every 24 hours  dexAMETHasone     Tablet 6 milliGRAM(s) Oral daily  enoxaparin Injectable 60 milliGRAM(s) SubCutaneous every 12 hours  guaifenesin/dextromethorphan Oral Liquid 10 milliLiter(s) Oral every 4 hours PRN  lisinopril 20 milliGRAM(s) Oral daily  senna 2 Tablet(s) Oral at bedtime      HEALTH ISSUES - PROBLEM Dx:  CAP (community acquired pneumonia)    COVID-19 virus infection    HTN (hypertension)    Parkinson disease    Chronic obstructive pulmonary disease (COPD)    H/O tracheostomy

## 2023-02-02 NOTE — SWALLOW BEDSIDE ASSESSMENT ADULT - SWALLOW EVAL: RECOMMENDED DIET
oral means of nutritionminced and moist with moderate thick liquid oral means of nutrition/hydration/medication contraindicated at this time however pt/family refusing PEG tube. suggest pleasure feed puree with moderate thick liquid

## 2023-02-02 NOTE — SWALLOW BEDSIDE ASSESSMENT ADULT - PHARYNGEAL PHASE
Wet vocal quality post oral intake/Cough post oral intake/Throat clear post oral intake/Delayed throat clear post oral intake/Multiple swallows Delayed throat clear post oral intake/Multiple swallows Delayed cough post oral intake/Delayed throat clear post oral intake/Multiple swallows Wet vocal quality post oral intake/Cough post oral intake/Multiple swallows

## 2023-02-02 NOTE — PROGRESS NOTE ADULT - REASON FOR ADMISSION
Weakness from COVID and pneumonia.

## 2023-02-02 NOTE — SWALLOW BEDSIDE ASSESSMENT ADULT - H & P REVIEW
"87 year old male PMH HTN, PD, Copd, lung ca, sp radiation and now with capped trach for several years, covid positive as of 1/21/23, never vaccinated. Now presents with sob, poor appetite, very weak.  Covid positive here in ER."/yes

## 2023-02-02 NOTE — PROGRESS NOTE ADULT - ASSESSMENT
87 year old male PMH HTN, PD, Copd, lung ca, sp radiation and now with trach capped at home, covid pos as of 1/21/23 not vaccinated. Now presents with sob, poor appetite, very weak. Covid positive       Plan: Admit to medicine for CAP and dehydration. Will start IVF and Ceftriaxone and Zithromax. CTA notes no PE, RLL infiltrate is confirmed. Normally   family removed trach cap at night and replaces in AM. Has FE on arrival with creatinine 1.58, follow repeat in AM. D-dimer is elevated 820, meets  criteria for Lovenox 1 mg/kg bid. RA sat normal at 97 %, hold on Remdiziver/Dedadron. Wife confirms her eats puree diet.      Acute bacterial PNA and COVID19  (1/28) Will start pt on decadron due to clinical exam. C/W monitoring resp status. Trach care. C/W abx  Pt eval: DAVON  (1/29) C/W abx. Trach care. C/W steroids  (1/30) C/W abx for PNA. PT reccomended DAVON. Discharge planning  (1/31) C/W abx for PNA. C/W dexamethasone. Discharge planning Titrate oxygen.  (2/1) C/W abx for PNA (till 2/3). C/W dexamethasone. Discharge planning. Plan to go to [facility] with his wife. Awaiting auth    Continue home meds of Ramipril, ASA, Lipitor, Sinemet and Albuterol at home doses.         87 year old male PMH HTN, PD, Copd, lung ca, sp radiation and now with trach capped at home, covid pos as of 1/21/23 not vaccinated. Now presents with sob, poor appetite, very weak. Covid positive       Plan: Admit to medicine for CAP and dehydration. Will start IVF and Ceftriaxone and Zithromax. CTA notes no PE, RLL infiltrate is confirmed. Normally   family removed trach cap at night and replaces in AM. Has FE on arrival with creatinine 1.58, follow repeat in AM. D-dimer is elevated 820, meets  criteria for Lovenox 1 mg/kg bid. RA sat normal at 97 %, hold on Remdiziver/Dedadron. Wife confirms her eats puree diet.      Acute bacterial PNA and COVID19  (1/28) Will start pt on decadron due to clinical exam. C/W monitoring resp status. Trach care. C/W abx  Pt eval: DAVON  (1/29) C/W abx. Trach care. C/W steroids  (1/30) C/W abx for PNA. PT reccomended DAVON. Discharge planning  (1/31) C/W abx for PNA. C/W dexamethasone. Discharge planning Titrate oxygen.  (2/1) C/W abx for PNA (till 2/3). C/W dexamethasone. Discharge planning. Plan to go to [facility] with his wife.   (2/2) Auth obtained, Plan for DC tomorrow    Continue home meds of Ramipril, ASA, Lipitor, Sinemet and Albuterol at home doses.

## 2023-02-02 NOTE — SWALLOW BEDSIDE ASSESSMENT ADULT - ORAL PHASE
Delayed oral transit time oral holding, ?repetitive anterior/posterior movement of bolus/Delayed oral transit time

## 2023-02-02 NOTE — PROGRESS NOTE ADULT - PROVIDER SPECIALTY LIST ADULT
Hospitalist
pt with acute sob, resp dist, placed on bipap, with improvement, hypotension (baseline) improved, pt comfortable at tiem of adimssion, no disrtress, resting comfortable. labs done, elev bnp, elev trop, creat at baseline. admitted to tele.

## 2023-02-03 ENCOUNTER — TRANSCRIPTION ENCOUNTER (OUTPATIENT)
Age: 88
End: 2023-02-03

## 2023-02-03 VITALS — SYSTOLIC BLOOD PRESSURE: 96 MMHG | DIASTOLIC BLOOD PRESSURE: 59 MMHG | HEART RATE: 59 BPM

## 2023-02-03 DIAGNOSIS — R13.10 DYSPHAGIA, UNSPECIFIED: ICD-10-CM

## 2023-02-03 DIAGNOSIS — Z51.5 ENCOUNTER FOR PALLIATIVE CARE: ICD-10-CM

## 2023-02-03 DIAGNOSIS — R53.81 OTHER MALAISE: ICD-10-CM

## 2023-02-03 LAB
ANION GAP SERPL CALC-SCNC: 6 MMOL/L — SIGNIFICANT CHANGE UP (ref 5–17)
BASOPHILS # BLD AUTO: 0 K/UL — SIGNIFICANT CHANGE UP (ref 0–0.2)
BASOPHILS NFR BLD AUTO: 0 % — SIGNIFICANT CHANGE UP (ref 0–2)
BUN SERPL-MCNC: 48 MG/DL — HIGH (ref 7–23)
CALCIUM SERPL-MCNC: 8.5 MG/DL — SIGNIFICANT CHANGE UP (ref 8.5–10.1)
CHLORIDE SERPL-SCNC: 111 MMOL/L — HIGH (ref 96–108)
CO2 SERPL-SCNC: 28 MMOL/L — SIGNIFICANT CHANGE UP (ref 22–31)
CREAT SERPL-MCNC: 1.19 MG/DL — SIGNIFICANT CHANGE UP (ref 0.5–1.3)
EGFR: 60 ML/MIN/1.73M2 — SIGNIFICANT CHANGE UP
ELLIPTOCYTES BLD QL SMEAR: SLIGHT — SIGNIFICANT CHANGE UP
EOSINOPHIL # BLD AUTO: 0 K/UL — SIGNIFICANT CHANGE UP (ref 0–0.5)
EOSINOPHIL NFR BLD AUTO: 0 % — SIGNIFICANT CHANGE UP (ref 0–6)
GLUCOSE SERPL-MCNC: 123 MG/DL — HIGH (ref 70–99)
HCT VFR BLD CALC: 46.2 % — SIGNIFICANT CHANGE UP (ref 39–50)
HGB BLD-MCNC: 15.2 G/DL — SIGNIFICANT CHANGE UP (ref 13–17)
LYMPHOCYTES # BLD AUTO: 0.23 K/UL — LOW (ref 1–3.3)
LYMPHOCYTES # BLD AUTO: 3 % — LOW (ref 13–44)
MANUAL SMEAR VERIFICATION: SIGNIFICANT CHANGE UP
MCHC RBC-ENTMCNC: 27.8 PG — SIGNIFICANT CHANGE UP (ref 27–34)
MCHC RBC-ENTMCNC: 32.9 G/DL — SIGNIFICANT CHANGE UP (ref 32–36)
MCV RBC AUTO: 84.5 FL — SIGNIFICANT CHANGE UP (ref 80–100)
MONOCYTES # BLD AUTO: 0.23 K/UL — SIGNIFICANT CHANGE UP (ref 0–0.9)
MONOCYTES NFR BLD AUTO: 3 % — SIGNIFICANT CHANGE UP (ref 2–14)
NEUTROPHILS # BLD AUTO: 7.06 K/UL — SIGNIFICANT CHANGE UP (ref 1.8–7.4)
NEUTROPHILS NFR BLD AUTO: 94 % — HIGH (ref 43–77)
NRBC # BLD: 0 /100 — SIGNIFICANT CHANGE UP (ref 0–0)
NRBC # BLD: SIGNIFICANT CHANGE UP /100 WBCS (ref 0–0)
PLAT MORPH BLD: NORMAL — SIGNIFICANT CHANGE UP
PLATELET # BLD AUTO: 195 K/UL — SIGNIFICANT CHANGE UP (ref 150–400)
POIKILOCYTOSIS BLD QL AUTO: SLIGHT — SIGNIFICANT CHANGE UP
POTASSIUM SERPL-MCNC: 3.8 MMOL/L — SIGNIFICANT CHANGE UP (ref 3.5–5.3)
POTASSIUM SERPL-SCNC: 3.8 MMOL/L — SIGNIFICANT CHANGE UP (ref 3.5–5.3)
RBC # BLD: 5.47 M/UL — SIGNIFICANT CHANGE UP (ref 4.2–5.8)
RBC # FLD: 14.6 % — HIGH (ref 10.3–14.5)
RBC BLD AUTO: SIGNIFICANT CHANGE UP
SCHISTOCYTES BLD QL AUTO: SLIGHT — SIGNIFICANT CHANGE UP
SODIUM SERPL-SCNC: 145 MMOL/L — SIGNIFICANT CHANGE UP (ref 135–145)
WBC # BLD: 7.51 K/UL — SIGNIFICANT CHANGE UP (ref 3.8–10.5)
WBC # FLD AUTO: 7.51 K/UL — SIGNIFICANT CHANGE UP (ref 3.8–10.5)

## 2023-02-03 PROCEDURE — 99239 HOSP IP/OBS DSCHRG MGMT >30: CPT

## 2023-02-03 PROCEDURE — 99223 1ST HOSP IP/OBS HIGH 75: CPT

## 2023-02-03 PROCEDURE — 99497 ADVNCD CARE PLAN 30 MIN: CPT | Mod: 25

## 2023-02-03 RX ORDER — CARBIDOPA AND LEVODOPA 25; 100 MG/1; MG/1
1 TABLET ORAL
Qty: 0 | Refills: 0 | DISCHARGE

## 2023-02-03 RX ORDER — RAMIPRIL 5 MG
5 CAPSULE ORAL
Qty: 150 | Refills: 0
Start: 2023-02-03 | End: 2023-03-04

## 2023-02-03 RX ORDER — RIVAROXABAN 15 MG-20MG
1 KIT ORAL
Qty: 30 | Refills: 0
Start: 2023-02-03 | End: 2023-03-04

## 2023-02-03 RX ORDER — SENNA PLUS 8.6 MG/1
2 TABLET ORAL
Qty: 0 | Refills: 0 | DISCHARGE
Start: 2023-02-03

## 2023-02-03 RX ORDER — AMLODIPINE BESYLATE 2.5 MG/1
1 TABLET ORAL
Qty: 0 | Refills: 0 | DISCHARGE
Start: 2023-02-03

## 2023-02-03 RX ORDER — DEXAMETHASONE 0.5 MG/5ML
1 ELIXIR ORAL
Qty: 3 | Refills: 0
Start: 2023-02-03 | End: 2023-02-05

## 2023-02-03 RX ORDER — RAMIPRIL 5 MG
5 CAPSULE ORAL
Qty: 0 | Refills: 0 | DISCHARGE

## 2023-02-03 RX ADMIN — ALBUTEROL 2 PUFF(S): 90 AEROSOL, METERED ORAL at 05:35

## 2023-02-03 RX ADMIN — ENOXAPARIN SODIUM 60 MILLIGRAM(S): 100 INJECTION SUBCUTANEOUS at 05:35

## 2023-02-03 RX ADMIN — Medication 81 MILLIGRAM(S): at 11:49

## 2023-02-03 RX ADMIN — LISINOPRIL 20 MILLIGRAM(S): 2.5 TABLET ORAL at 05:35

## 2023-02-03 RX ADMIN — CEFTRIAXONE 100 MILLIGRAM(S): 500 INJECTION, POWDER, FOR SOLUTION INTRAMUSCULAR; INTRAVENOUS at 05:36

## 2023-02-03 RX ADMIN — CARBIDOPA AND LEVODOPA 1 TABLET(S): 25; 100 TABLET ORAL at 13:37

## 2023-02-03 RX ADMIN — CARBIDOPA AND LEVODOPA 1 TABLET(S): 25; 100 TABLET ORAL at 07:48

## 2023-02-03 RX ADMIN — Medication 6 MILLIGRAM(S): at 05:35

## 2023-02-03 NOTE — CONSULT NOTE ADULT - CONVERSATION DETAILS
Spoke with patient's wife who is in the hospital as a patient in the same room.  She is aware patient did not pass swallow evaluation and prefers patient continue to eat by mouth rather than have a feeding tube.  Discussed pleasure feeds and risk for aspiration with pleasure feeds or with feeding tube.  She prefers patient preserve as much quality of life over prolonging life.  Introduced MOLST form and discussed option for no feeding tube.  Further, patient remains at risk for rehospitalization secondary to aspiration or other events as he is debilitated with several underlying comorbid conditions.  Discussed hospice as an option and explained philsophy of care.  Patient's wife seemed interested in not rehospitalizing patient and avoiding further testing and focusing on quality of life.  She asked that I reach out to her daughters to discuss the above.  Further addressed importance of clarifying wishes regarding LST such as CPR and mechanical ventilation. Spoke with patient's wife who is in the hospital as a patient in the same room.  She is aware patient did not pass swallow evaluation and prefers patient continue to eat by mouth rather than have a feeding tube.  Discussed pleasure feeds and risk for aspiration with pleasure feeds or with feeding tube.  She prefers patient preserve as much quality of life over prolonging life.  Introduced MOLST form and discussed option for no feeding tube.  Further, patient remains at risk for rehospitalization secondary to aspiration or other events as he is debilitated with several underlying comorbid conditions.  Discussed hospice as an option and explained philosophy of care.  Patient's wife seemed interested in not rehospitalizing patient and avoiding further testing and focusing on quality of life.   Discussed referral could be made for hospice in the community at any point.  She asked that I reach out to her daughters to discuss the above.  Further addressed importance of clarifying wishes regarding LST such as CPR and mechanical ventilation.

## 2023-02-03 NOTE — DISCHARGE NOTE PROVIDER - NSDCMRMEDTOKEN_GEN_ALL_CORE_FT
albuterol 2.5 mg/3 mL (0.083%) inhalation solution: inhaled 2 times a day  aspirin 81 mg oral tablet, chewable: 1 tab(s) orally once a day  atorvastatin 20 mg oral tablet: 1 tab(s) orally once a day (at bedtime)  carbidopa-levodopa 25 mg-100 mg oral tablet: 1 tab(s) orally 3 times a day  ramipril 5 mg oral tablet: 5 milligram(s) orally once a day   albuterol 2.5 mg/3 mL (0.083%) inhalation solution: inhaled 2 times a day  aspirin 81 mg oral tablet, chewable: 1 tab(s) orally once a day  atorvastatin 20 mg oral tablet: 1 tab(s) orally once a day (at bedtime)  carbidopa-levodopa 25 mg-100 mg oral tablet: 1 tab(s) orally 3 times a day at 8am, 2pm and 8pm  dexamethasone 6 mg oral tablet: 1 tab(s) orally once a day  ramipril 5 mg oral tablet: 5 milligram(s) orally once a day  senna leaf extract oral tablet: 2 tab(s) orally once a day (at bedtime)  Xarelto 10 mg oral tablet: 1 tab(s) orally once a day

## 2023-02-03 NOTE — CONSULT NOTE ADULT - ASSESSMENT
87 year old male PMH of Parkinson's, COPD (trach with trach collar since 2016), h/o lung cancer s/p radiation therapy presented to the ED for shortness of breath, decreased po intake and weakness.  Patient COVID positive with superimposed pna.  Patient failed swallow evaluation.  Palliative care consulted for GOC. 87 year old male PMH of Parkinson's, COPD (trach since 2016, has been capped), h/o lung cancer s/p radiation therapy presented to the ED for shortness of breath, decreased po intake and weakness.  Patient COVID positive with superimposed pna.  Patient failed swallow evaluation.  Palliative care consulted for GOC.

## 2023-02-03 NOTE — DISCHARGE NOTE PROVIDER - ATTENDING DISCHARGE PHYSICAL EXAMINATION:
Vital Signs Last 24 Hrs  T(C): 36.4 (03 Feb 2023 11:27), Max: 36.8 (02 Feb 2023 17:36)  T(F): 97.6 (03 Feb 2023 11:27), Max: 98.2 (02 Feb 2023 17:36)  HR: 59 (03 Feb 2023 12:24) (59 - 100)  BP: 96/59 (03 Feb 2023 12:24) (93/58 - 120/74)  BP(mean): --  RR: 19 (03 Feb 2023 11:27) (18 - 20)  SpO2: 93% (03 Feb 2023 06:10) (93% - 95%)    Parameters below as of 03 Feb 2023 12:24  Patient On (Oxygen Delivery Method): tracheostomy collar  O2 Flow (L/min): 5      02-02-23 @ 07:01  -  02-03-23 @ 07:00  --------------------------------------------------------  IN: 50 mL / OUT: 0 mL / NET: 50 mL        PHYSICAL EXAM:  GENERAL: NAD, well-groomed, thin  HEAD:  Atraumatic, Normocephalic  EYES: EOMI, conjunctiva and sclera clear  ENMT: Moist mucous membranes,  NECK: Supple, +trach  NERVOUS SYSTEM:  Alert & awake, Moving upper extremities   CHEST/LUNG: No rales, rhonchi, +wheezing,  HEART: Regular rate and rhythm; No murmurs, rubs, or gallops  ABDOMEN: Soft, Nontender, Nondistended; Bowel sounds present  EXTREMITIES:  2+ Peripheral Pulses, No clubbing, cyanosis, or edema  LYMPH: No lymphadenopathy noted  SKIN: No rashes or lesions

## 2023-02-03 NOTE — CONSULT NOTE ADULT - PROBLEM SELECTOR RECOMMENDATION 5
COPD exacerbation was intubated and required long-term mechnical vent with trach in 2016, lives with wife, has trach capped and uses speaking valve COPD exacerbation was intubated and required long-term mechanical vent with trach in 2016, lives with wife, has trach capped and uses speaking valve at baseline, trach collar on

## 2023-02-03 NOTE — CONSULT NOTE ADULT - PROBLEM SELECTOR RECOMMENDATION 7
See GOC above. See GOC above.  Called patient's daughter's Shena and Rachna to discuss GOC, left  requesting call back.  Patient high risk for aspiration and rehospitalization would be appropriate for hospice at this juncture if within GOC.      Discussed with Dr. Jauregui and JUSITNE Sheehan

## 2023-02-03 NOTE — DISCHARGE NOTE NURSING/CASE MANAGEMENT/SOCIAL WORK - PATIENT PORTAL LINK FT
You can access the FollowMyHealth Patient Portal offered by [facility] by registering at the following website: [email or web address]. By joining [facility] FollowMyHealth portal, you will also be able to view your health information using other applications (apps) compatible with our system.

## 2023-02-03 NOTE — DISCHARGE NOTE PROVIDER - NSDCCPCAREPLAN_GEN_ALL_CORE_FT
PRINCIPAL DISCHARGE DIAGNOSIS  Diagnosis: Pneumonia due to COVID-19 virus  Assessment and Plan of Treatment: continue steroids and supportive treatment      SECONDARY DISCHARGE DIAGNOSES  Diagnosis: HTN (hypertension)  Assessment and Plan of Treatment: continue medications    Diagnosis: Parkinson disease  Assessment and Plan of Treatment: continue medications    Diagnosis: H/O tracheostomy  Assessment and Plan of Treatment: capped    Diagnosis: Chronic obstructive pulmonary disease (COPD)  Assessment and Plan of Treatment: continue inhalers    Diagnosis: CAP (community acquired pneumonia)  Assessment and Plan of Treatment: antibiotics. supportive treatment     PRINCIPAL DISCHARGE DIAGNOSIS  Diagnosis: Pneumonia due to COVID-19 virus  Assessment and Plan of Treatment: continue steroids for 3 more days and supportive treatment. Covid increase th erisk of making clot. To prevent that, please take anticoagulation Xarelto 10mg for 30 days. If you see signs of bleeding, stop taking it and see the doctor immediately.   Continue with oxygen supplementaion for now and taper down.      SECONDARY DISCHARGE DIAGNOSES  Diagnosis: CAP (community acquired pneumonia)  Assessment and Plan of Treatment: You completed antibiotic treatment. supportive treatment    Diagnosis: HTN (hypertension)  Assessment and Plan of Treatment: You have high blood pressure. Please continue with ramipril. It is important to continue to take your medications on time,  monitor your blood pressure at home, keep a log of your home readings and follow up with your Primary Care Physician.   If your blood pressure is less than 110mgHg, you can hold blood pressure medication once then check your blood pressure again later.  As per AHA/ACC guidelines, it is important to adhere to a DASH Diet of fresh fruits, vegetables, lean meats such as poultry and fish, and whole wheat carbs. 30 minutes of aerobic exercise per day 3-4 times a week, reducing salt intake <1.5g/day, and cutting down on highly processed foods are also shown to reduce BP. Uncontrolled BP may result in organ damage to your eyes, brain, heart, and kidneys by causing strokes, heart attacks, kidney failure, and bleeds in your eye.    Diagnosis: Parkinson disease  Assessment and Plan of Treatment: continue levodopa-carbidopa at 8am, 2pm and 8pm.    Diagnosis: Chronic obstructive pulmonary disease (COPD)  Assessment and Plan of Treatment: continue inhalers    Diagnosis: H/O tracheostomy  Assessment and Plan of Treatment: Please get suction as needed and use your speech cap when you don't require oxygen

## 2023-02-03 NOTE — DISCHARGE NOTE PROVIDER - INSTRUCTIONS
He has high risk of aspiration. Family decided NO feeding tube. Pt can have pleasure feed, but please understand that there is always the risk of aspiration.

## 2023-02-03 NOTE — CONSULT NOTE ADULT - PROBLEM SELECTOR RECOMMENDATION 4
on sinemet, lives with wife at home, still ambulatory  dysphagia may be component of Parkinson's progression

## 2023-02-03 NOTE — CONSULT NOTE ADULT - SUBJECTIVE AND OBJECTIVE BOX
HPI:  87 year old male PMH HTN, PD, Copd, lung ca, sp radiation and now with capped trach for several years, covid positive as of 1/21/23, never vaccinated. Now presents with sob, poor appetite, very weak.  Covid positive here in ER.  (27 Jan 2023 19:20)    PERTINENT PM/SXH:       FAMILY HISTORY:    ITEMS NOT CHECKED ARE NOT PRESENT    SOCIAL HISTORY:   Significant other/partner: yes [ ]  Children: yes  [ ]  Uatsdin/Spirituality: Muslim   Substance hx:  [ ]   Tobacco hx:  [ ]   Alcohol hx: [ ]   Home Opioid hx:  [ ] I-Stop Reference No: Reference #: [id number]  Living Situation: [x ]Home  [ ]Long term care  [ ]Rehab [ ]Other    ADVANCE DIRECTIVES:    DNR  MOLST  [ ]  Living Will  [ x]   DECISION MAKER(s):  [ ] Health Care Proxy(s)  [x ] Surrogate(s)  [ ] Guardian           Name(s): Phone Number(s): Dayanna Machado (wife) [phone]/ Rachna (daughter) [phone]/ Shena (daughter) [phone]    BASELINE (I)ADL(s) (prior to admission):  [place]: [ ]Total  [x ] Moderate [ ]Dependent    Allergies    No Known Allergies    Intolerances    MEDICATIONS  (STANDING):  albuterol    90 MICROgram(s) HFA Inhaler 2 Puff(s) Inhalation every 12 hours  amLODIPine   Tablet 10 milliGRAM(s) Oral at bedtime  aspirin enteric coated 81 milliGRAM(s) Oral daily  atorvastatin 20 milliGRAM(s) Oral at bedtime  carbidopa/levodopa  25/100 1 Tablet(s) Oral <User Schedule>  dexAMETHasone     Tablet 6 milliGRAM(s) Oral daily  enoxaparin Injectable 60 milliGRAM(s) SubCutaneous every 12 hours  lisinopril 20 milliGRAM(s) Oral daily  senna 2 Tablet(s) Oral at bedtime    MEDICATIONS  (PRN):  guaifenesin/dextromethorphan Oral Liquid 10 milliLiter(s) Oral every 4 hours PRN Cough    PRESENT SYMPTOMS: [x ]Unable to obtain due to poor mentation   Source if other than patient:  [ ]Family   [ ]Team     Pain: [ ] yes [ x] no  QOL impact -   Location -                    Aggravating factors -  Quality -  Radiation -  Timing-  Severity (0-10 scale):  Minimal acceptable level (0-10 scale):     PAIN AD Score: 0    [email or web address]/[email or web address] (press ctrl +  left click to view)    Dyspnea:                           [ ]Mild [ ]Moderate [ ]Severe  Anxiety:                             [ ]Mild [ ]Moderate [ ]Severe  Fatigue:                             [ ]Mild [ ]Moderate [ ]Severe  Nausea:                             [ ]Mild [ ]Moderate [ ]Severe  Loss of appetite:              [ ]Mild [ ]Moderate [ ]Severe  Constipation:                    [ ]Mild [ ]Moderate [ ]Severe    Other Symptoms:  [ ]All other review of systems negative     Karnofsky Performance Score/Palliative Performance Status Version 2:       40  %    http://npcrc.org/files/news/palliative_performance_scale_ppsv2.pdf  PHYSICAL EXAM:  Vital Signs Last 24 Hrs  T(C): 36.4 (03 Feb 2023 11:27), Max: 36.8 (02 Feb 2023 17:36)  T(F): 97.6 (03 Feb 2023 11:27), Max: 98.2 (02 Feb 2023 17:36)  HR: 62 (03 Feb 2023 11:27) (62 - 100)  BP: 93/58 (03 Feb 2023 11:27) (93/58 - 120/74)  BP(mean): --  RR: 19 (03 Feb 2023 11:27) (18 - 20)  SpO2: 93% (03 Feb 2023 06:10) (93% - 95%)    Parameters below as of 03 Feb 2023 06:10  Patient On (Oxygen Delivery Method): tracheostomy collar,Fen.  O2 Flow (L/min): 4  O2 Concentration (%): 28 I&O's Summary    02 Feb 2023 07:01  -  03 Feb 2023 07:00  --------------------------------------------------------  IN: 50 mL / OUT: 0 mL / NET: 50 mL    GENERAL:  [ x]Alert  [x ]Oriented x  3 [ ]Lethargic  [ ]Cachexia  [ ]Unarousable  [ x]Verbal with speaking valve, low voice quality  [ ]Non-Verbal  Behavioral:   [ ] Anxiety  [ ] Delirium [ ] Agitation [ ] Other  HEENT:  [ ]Normal   [ ]Dry mouth   [ x]ET Tube/Trach  [ ]Oral lesions  PULMONARY:   [ ]Clear [ ]Tachypnea  [ ]Audible excessive secretions   [ ]Rhonchi        [ ]Right [ ]Left [ ]Bilateral  [ ]Crackles        [ ]Right [ ]Left [ ]Bilateral  [ ]Wheezing     [ ]Right [ ]Left [ ]Bilateral  CARDIOVASCULAR:    [ x]Regular [ ]Irregular [ ]Tachy  [ ]Blair [ ]Murmur [ ]Other  GASTROINTESTINAL:  [x ]Soft  [ ]Distended   [ ]+BS  [ x]Non tender [ ]Tender  [ ]PEG [ ]OGT/ NGT  Last BM: 1/29/23 GENITOURINARY:  [ ]Normal [x ] Incontinent   [ ]Oliguria/Anuria   [ ]Knapp  MUSCULOSKELETAL:   [ ]Normal   [x ]Weakness  [ ]Bed/Wheelchair bound [ ]Edema  NEUROLOGIC:   [ ]No focal deficits  [ x] Cognitive impairment forgetful [ ] Dysphagia [ ]Dysarthria [ ] Paresis [ ]Other   SKIN:   [ ]Normal   [x ]Pressure ulcer(s) left and right buttocks suspected DTI [ ]Rash    CRITICAL CARE:  [ ] Shock Present  [ ]Septic [ ]Cardiogenic [ ]Neurologic [ ]Hypovolemic  [ ]  Vasopressors [ ]  Inotropes   [ ] Respiratory failure present [ ] mechanical ventilation [ ] non-invasive ventilatory support [ ] High flow  [ ] Acute  [ ] Chronic [ ] Hypoxic  [ ] Hypercarbic [ ] Other  [ ] Other organ failure     LABS:                        15.2   7.51  )-----------( 195      ( 03 Feb 2023 06:20 )             46.2   02-03    145  |  111<H>  |  48<H>  ----------------------------<  123<H>  3.8   |  28  |  1.19    Ca    8.5      03 Feb 2023 06:20    TPro  6.2  /  Alb  2.4<L>  /  TBili  0.6  /  DBili  x   /  AST  79<H>  /  ALT  33  /  AlkPhos  110  02-02    Flu With COVID-19 By MONO (02.02.23 @ 15:40)    SARS-CoV-2 Result: Detected: EUA/IVD  This Respiratory Panel uses polymerase chain reaction (PCR) to detect for  influenza A; influenza B; and SARS-CoV-2.  This test was validated by [facility] and is in use under the FDA  Emergency Use Authorization (EUA) for clinical labs CLIA-certified to  perform high complexity testing. Test results should be correlated with  clinical presentation, patient history, and epidemiology.    Influenza A Result: NotDetec: EUA/IVD    Influenza B Result: NotDetec: EUA/IVD    RADIOLOGY & ADDITIONAL STUDIES:   < from: CT Angio Chest PE Protocol w/ IV Cont (01.27.23 @ 17:19) >  IMPRESSION:  Multiple segmental andsubsegmental branches in the lower lobes cannot be   evaluated due to motion. No pulmonary embolism elsewhere.  Right lower lobe pneumonia. Mild groundglass in the left lower lobe,   likely infectious or inflammatory. Follow-up in 6 weeks is recommended to   assess for change.  --- End of Report ---  < end of copied text >    < from: Xray Chest 1 View-PORTABLE IMMEDIATE (01.27.23 @ 12:11) >  INTERPRETATION:  AP semierect chest on January 27, 2023 at 11:38 AM.   Patient is short of breath with cough and fever.  COMPARISON: None available.  Heart size normal. Tracheostomy is seen.  There is a slight right base infiltrate.  Mild to moderate bowel distention seen.  IMPRESSION: As above.  --- End of Report ---  < end of copied text >    PROTEIN CALORIE MALNUTRITION PRESENT: [ ] Yes [ ] No  [ ] PPSV2 < or = to 30% [ ] significant weight loss  [ ] poor nutritional intake [ ] catabolic state [ ] anasarca     Artificial Nutrition [ ]     REFERRALS:   [ ]Chaplaincy  [ ] Hospice  [ ]Child Life  [ ]Social Work  [ ]Case management [ ]Holistic Therapy     Goals of Care Document:   Care Coordination Assessment 201 [C. Provider] (01-31-23 @ 15:44)   Progress Notes - Care Coordination [C. Provider] (02-02-23 @ 14:47)

## 2023-02-03 NOTE — DISCHARGE NOTE PROVIDER - HOSPITAL COURSE
87 year old male PMH HTN, PD, Copd, lung ca, sp radiation and now with trach capped at home, covid pos as of 1/21/23 not vaccinated. Now presents with sob, poor appetite, very weak. Covid positive   Patient admitted to medicine for CAP and dehydration, started on IVF and antibiotics. CTA notes no PE, RLL infiltrate is confirmed. Normally family removed trach cap at night and replaces in AM. Has FE on arrival with creatinine 1.58, follow repeat in AM. D-dimer is elevated 820, meets criteria for Lovenox 1 mg/kg bid. RA sat normal at 97 %, hold on Remdiziver/Dedadron. Wife confirms her eats puree diet.    Patient started on decadron for covid due to clincial exam. Physical therapy evaluated, recommended DAVON.   Continue home meds of Ramipril, ASA, Lipitor, Sinemet and Albuterol at home doses.    On 2/3 patient cleared for discharge to [facility].

## 2023-02-09 DIAGNOSIS — Z87.891 PERSONAL HISTORY OF NICOTINE DEPENDENCE: ICD-10-CM

## 2023-02-09 DIAGNOSIS — Z93.0 TRACHEOSTOMY STATUS: ICD-10-CM

## 2023-02-09 DIAGNOSIS — U07.1 COVID-19: ICD-10-CM

## 2023-02-09 DIAGNOSIS — I10 ESSENTIAL (PRIMARY) HYPERTENSION: ICD-10-CM

## 2023-02-09 DIAGNOSIS — Z28.310 UNVACCINATED FOR COVID-19: ICD-10-CM

## 2023-02-09 DIAGNOSIS — N17.9 ACUTE KIDNEY FAILURE, UNSPECIFIED: ICD-10-CM

## 2023-02-09 DIAGNOSIS — G20 PARKINSON'S DISEASE: ICD-10-CM

## 2023-02-09 DIAGNOSIS — R54 AGE-RELATED PHYSICAL DEBILITY: ICD-10-CM

## 2023-02-09 DIAGNOSIS — R13.10 DYSPHAGIA, UNSPECIFIED: ICD-10-CM

## 2023-02-09 DIAGNOSIS — Z85.118 PERSONAL HISTORY OF OTHER MALIGNANT NEOPLASM OF BRONCHUS AND LUNG: ICD-10-CM

## 2023-02-09 DIAGNOSIS — J15.9 UNSPECIFIED BACTERIAL PNEUMONIA: ICD-10-CM

## 2023-02-09 DIAGNOSIS — E86.0 DEHYDRATION: ICD-10-CM

## 2023-02-09 DIAGNOSIS — J44.9 CHRONIC OBSTRUCTIVE PULMONARY DISEASE, UNSPECIFIED: ICD-10-CM

## 2023-06-08 NOTE — PHYSICAL THERAPY INITIAL EVALUATION ADULT - BED MOBILITY TRAINING, PT EVAL
bilateral LE Active ROM was WFL  (within functional limits)/bilateral LE Passive ROM was WFL  (within functional limits) To be able to perform bed mobility including supine to sit, sit to supine Independently in order to facility safe transfers by 4 weeks

## 2024-08-18 NOTE — H&P ADULT - NSHPPOADEEPVENOUSTHROMB_GEN_A_CORE
Goal Outcome Evaluation:              Outcome Evaluation: VSS. RESTED WELL IN BETWEEN CARE. FOR OR TODAY.                                no

## 2024-11-26 NOTE — CONSULT NOTE ADULT - PROBLEM SELECTOR RECOMMENDATION 2
40-year-old female presenting for follow-up.  She was last seen in August 2024.  She does have a prior history of C. difficile infection status post treated with vancomycin and Flagyl as an inpatient.  She was subsequently prescribed fidaxomicin in June 2024.  She was continuing to have C. difficile and we checked her C. difficile PCR.  This was negative in August 2024.  She is presenting today for follow-up.  She is having 3 accidents weekly. She has tried Benefiber. She did have cramping. Seh denies any nausea/emesis. She does sometimes have intermittent dysphagia. It occurs a few times per month. It is typically sandwich meat or "something thin".  She does not feel like she has any reflux. She avoids acidic foods.   She has had an EGD long ago and it was normal. She has never had a CSC. swallow daja appreciated, family stating he has had discussions of PEG with ENT, but would want pleasure feeds and  per wife would prefer quality of life over prolongation of life.  HOBE and oral hygiene

## 2025-03-11 NOTE — DISCHARGE NOTE NURSING/CASE MANAGEMENT/SOCIAL WORK - NSDCPETBCESMAN_GEN_ALL_CORE
Noted. Thanks.  
Patient's message:  \"Tell Mekhi 
If you are a smoker, it is important for your health to stop smoking. Please be aware that second hand smoke is also harmful.